# Patient Record
Sex: FEMALE | Race: WHITE | NOT HISPANIC OR LATINO | Employment: FULL TIME | URBAN - METROPOLITAN AREA
[De-identification: names, ages, dates, MRNs, and addresses within clinical notes are randomized per-mention and may not be internally consistent; named-entity substitution may affect disease eponyms.]

---

## 2022-03-15 ENCOUNTER — INITIAL PRENATAL (OUTPATIENT)
Dept: OBSTETRICS AND GYNECOLOGY | Facility: CLINIC | Age: 37
End: 2022-03-15

## 2022-03-15 VITALS
DIASTOLIC BLOOD PRESSURE: 68 MMHG | SYSTOLIC BLOOD PRESSURE: 100 MMHG | WEIGHT: 147.8 LBS | BODY MASS INDEX: 26.19 KG/M2 | HEIGHT: 63 IN

## 2022-03-15 DIAGNOSIS — Z3A.01 LESS THAN 8 WEEKS GESTATION OF PREGNANCY: Primary | ICD-10-CM

## 2022-03-15 PROCEDURE — 0501F PRENATAL FLOW SHEET: CPT | Performed by: OBSTETRICS & GYNECOLOGY

## 2022-03-15 RX ORDER — DOCOSAHEXAENOIC ACID 200 MG
CAPSULE ORAL
COMMUNITY
End: 2022-10-13

## 2022-03-15 RX ORDER — PRENATAL VIT NO.126/IRON/FOLIC 28MG-0.8MG
TABLET ORAL DAILY
COMMUNITY

## 2022-03-15 NOTE — PROGRESS NOTES
"Initial ob visit     CC- Here for care of pregnancy        Katy Jackson is a 36 y.o. female, , who presents for her first obstetrical visit.  Her last LMP was Patient's last menstrual period was 2022 (exact date). U/S today shows single viable IUP. New OB folder given and reviewed.    # 1 - Date: None, Sex: None, Weight: None, GA: None, Delivery: None, Apgar1: None, Apgar5: None, Living: None, Birth Comments: None      Current obstetric complaints : fatigue, breast tenderness  Initial positive test date : 20    [place] : [place]  Prior obstetric issues, None  Potential pregnancy concerns: none  Family history of genetic issues (includes FOB): no   Prior infections concerning in pregnancy (Rash, fever in last 2 weeks): no   Varicella Hx -as a child  Prior testing for Cystic Fibrosis Carrier or Sickle Cell Trait- no   Prepregnancy BMI - Body mass index is 26.18 kg/m².  Hx of HSV for patient or partner : no     Additional Pertinent History   Last Pap : [id number] per patient   Last Completed Pap Smear     This patient has no relevant Health Maintenance data.        History of abnormal Pap smear: yes - back in college, froze something per pt, no abnormals since  Family history of uterine, colon, breast, or ovarian cancer: yes - paternal grandmother  Performs monthly Self-Breast Exam: no  Exercises Regularly: yes  Feelings of Anxiety or Depression: no  Tobacco Usage?: No     The additional following portions of the patient's history were reviewed and updated as appropriate: allergies, current medications, past family history, past medical history, past social history, past surgical history and problem list.    Review of Systems   Review of Systems  Constitutional : Nausea, fatigue    : Vaginal bleeding, cramping   Breast Tenderness   All systems reviewed     /68   Ht 160 cm (63\")   Wt 67 kg (147 lb 12.8 oz)   LMP 2022 (Exact Date)   BMI 26.18 kg/m²     Physical Exam  General " Appearance:    Alert, cooperative, in no acute distress   Head:    Normocephalic, without obvious abnormality, atraumatic   Eyes:            Lids and lashes normal, conjunctivae and sclerae normal, no icterus, no pallor, corneas clear   Ears:    Ears appear intact with no abnormalities noted       Neck:      Neck without masses or thyromegaly    Abdomen:    Soft without masses or tenderness   :           Neck:   No adenopathy, supple, trachea midline, no thyromegaly   Back:     No kyphosis present, no scoliosis present,                       Extremities:   Moves all extremities well, no edema, no cyanosis   Skin:   No bleeding, bruising or rash   Lymph nodes:   No palpable adenopathy   Neurologic:   Sensation intact, A&O times 3        Assessment/Plan   Assessment     Problem List Items Addressed This Visit    None     Visit Diagnoses     Less than 8 weeks gestation of pregnancy    -  Primary    Relevant Orders    Obstetric Panel    Chlamydia trachomatis, Neisseria gonorrhoeae, PCR w/ confirmation - Urine, Urine, Clean Catch    HIV-1 / O / 2 Ag / Antibody 4th Generation    Urinalysis With Microscopic - Urine, Clean Catch    Urine Culture - Urine, Urine, Clean Catch    Urine Drug Screen - Urine, Clean Catch    Pap IG, HPV-hr          1. Pregnancy at 8w0d  2. AMA    Plan     1. Reviewed routine prenatal care with the office and educational materials given  2. Counseled on genetic testing options including CF DNA, carrier testing including CF, SMA, and Fragile X,  and NT screening.  3. Follow up: 4 week(s)        Claire Nguyen MD  03/15/2022

## 2022-03-17 LAB
ABO GROUP BLD: NORMAL
AMPHETAMINES UR QL SCN: NEGATIVE NG/ML
APPEARANCE UR: CLEAR
BACTERIA #/AREA URNS HPF: NORMAL /[HPF]
BACTERIA UR CULT: NORMAL
BACTERIA UR CULT: NORMAL
BARBITURATES UR QL SCN: NEGATIVE NG/ML
BASOPHILS # BLD AUTO: 0.1 X10E3/UL (ref 0–0.2)
BASOPHILS NFR BLD AUTO: 1 %
BENZODIAZ UR QL SCN: NEGATIVE NG/ML
BILIRUB UR QL STRIP: NEGATIVE
BLD GP AB SCN SERPL QL: NEGATIVE
BZE UR QL SCN: NEGATIVE NG/ML
C TRACH RRNA SPEC QL NAA+PROBE: NEGATIVE
CANNABINOIDS UR QL SCN: NEGATIVE NG/ML
CASTS URNS QL MICRO: NORMAL /LPF
COLOR UR: YELLOW
CREAT UR-MCNC: 14.5 MG/DL (ref 20–300)
EOSINOPHIL # BLD AUTO: 0.1 X10E3/UL (ref 0–0.4)
EOSINOPHIL NFR BLD AUTO: 1 %
EPI CELLS #/AREA URNS HPF: NORMAL /HPF (ref 0–10)
ERYTHROCYTE [DISTWIDTH] IN BLOOD BY AUTOMATED COUNT: 11.8 % (ref 11.7–15.4)
GLUCOSE UR QL STRIP: NEGATIVE
HBV SURFACE AG SERPL QL IA: NEGATIVE
HCT VFR BLD AUTO: 38.2 % (ref 34–46.6)
HCV AB S/CO SERPL IA: <0.1 S/CO RATIO (ref 0–0.9)
HGB BLD-MCNC: 13 G/DL (ref 11.1–15.9)
HGB UR QL STRIP: NEGATIVE
HIV 1+2 AB+HIV1 P24 AG SERPL QL IA: NON REACTIVE
IMM GRANULOCYTES # BLD AUTO: 0 X10E3/UL (ref 0–0.1)
IMM GRANULOCYTES NFR BLD AUTO: 0 %
KETONES UR QL STRIP: NEGATIVE
LABORATORY COMMENT REPORT: ABNORMAL
LEUKOCYTE ESTERASE UR QL STRIP: ABNORMAL
LYMPHOCYTES # BLD AUTO: 2.6 X10E3/UL (ref 0.7–3.1)
LYMPHOCYTES NFR BLD AUTO: 29 %
MCH RBC QN AUTO: 32.3 PG (ref 26.6–33)
MCHC RBC AUTO-ENTMCNC: 34 G/DL (ref 31.5–35.7)
MCV RBC AUTO: 95 FL (ref 79–97)
METHADONE UR QL SCN: NEGATIVE NG/ML
MICRO URNS: ABNORMAL
MONOCYTES # BLD AUTO: 0.6 X10E3/UL (ref 0.1–0.9)
MONOCYTES NFR BLD AUTO: 7 %
N GONORRHOEA RRNA SPEC QL NAA+PROBE: NEGATIVE
NEUTROPHILS # BLD AUTO: 5.4 X10E3/UL (ref 1.4–7)
NEUTROPHILS NFR BLD AUTO: 62 %
NITRITE UR QL STRIP: NEGATIVE
OPIATES UR QL SCN: NEGATIVE NG/ML
OXYCODONE+OXYMORPHONE UR QL SCN: NEGATIVE NG/ML
PCP UR QL: NEGATIVE NG/ML
PH UR STRIP: 7.5 [PH] (ref 5–7.5)
PH UR: 7.4 [PH] (ref 4.5–8.9)
PLATELET # BLD AUTO: 317 X10E3/UL (ref 150–450)
PROPOXYPH UR QL SCN: NEGATIVE NG/ML
PROT UR QL STRIP: NEGATIVE
RBC # BLD AUTO: 4.03 X10E6/UL (ref 3.77–5.28)
RBC #/AREA URNS HPF: NORMAL /HPF (ref 0–2)
RH BLD: POSITIVE
RPR SER QL: NON REACTIVE
RUBV IGG SERPL IA-ACNC: 3.76 INDEX
SP GR UR STRIP: 1 (ref 1–1.03)
SP GR UR: 1
UROBILINOGEN UR STRIP-MCNC: 0.2 MG/DL (ref 0.2–1)
WBC # BLD AUTO: 8.8 X10E3/UL (ref 3.4–10.8)
WBC #/AREA URNS HPF: NORMAL /HPF (ref 0–5)

## 2022-03-28 ENCOUNTER — TELEPHONE (OUTPATIENT)
Dept: OBSTETRICS AND GYNECOLOGY | Facility: CLINIC | Age: 37
End: 2022-03-28

## 2022-03-28 DIAGNOSIS — Z3A.10 10 WEEKS GESTATION OF PREGNANCY: Primary | ICD-10-CM

## 2022-03-29 ENCOUNTER — LAB (OUTPATIENT)
Dept: OBSTETRICS AND GYNECOLOGY | Facility: CLINIC | Age: 37
End: 2022-03-29

## 2022-04-08 ENCOUNTER — ROUTINE PRENATAL (OUTPATIENT)
Dept: OBSTETRICS AND GYNECOLOGY | Facility: CLINIC | Age: 37
End: 2022-04-08

## 2022-04-08 ENCOUNTER — TELEPHONE (OUTPATIENT)
Dept: OBSTETRICS AND GYNECOLOGY | Facility: CLINIC | Age: 37
End: 2022-04-08

## 2022-04-08 VITALS — SYSTOLIC BLOOD PRESSURE: 130 MMHG | BODY MASS INDEX: 26.15 KG/M2 | DIASTOLIC BLOOD PRESSURE: 72 MMHG | WEIGHT: 147.6 LBS

## 2022-04-08 DIAGNOSIS — O26.851 SPOTTING AFFECTING PREGNANCY IN FIRST TRIMESTER: ICD-10-CM

## 2022-04-08 DIAGNOSIS — Z3A.11 11 WEEKS GESTATION OF PREGNANCY: Primary | ICD-10-CM

## 2022-04-08 DIAGNOSIS — K59.00 CONSTIPATION, UNSPECIFIED CONSTIPATION TYPE: ICD-10-CM

## 2022-04-08 LAB
CLARITY, POC: CLEAR
COLOR UR: YELLOW
GLUCOSE UR STRIP-MCNC: NEGATIVE MG/DL
PROT UR STRIP-MCNC: NEGATIVE MG/DL

## 2022-04-08 PROCEDURE — 0502F SUBSEQUENT PRENATAL CARE: CPT | Performed by: NURSE PRACTITIONER

## 2022-04-08 NOTE — PROGRESS NOTES
OB FOLLOW UP  CC- Here for care of pregnancy        Katy Jackson is a 36 y.o.  11w3d patient being seen today for her obstetrical follow up visit. Patient reports vaginal spotting and headaches. Patient stated that the spotting occurred this morning.  Also yellow/brown discharge.  She has been constipated for about 2 days. Patient denies any cramping but does have pressure.     Her prenatal care is complicated by (and status) :  none      Flu Status: Already given in current flu season  Ultrasound Today: No.    ROS -   Patient Reports : Vaginal Spotting and Headaches  Patient Denies: Loss of Fluid, Vision Changes, Nausea , Vomiting , Contractions and Epigastric pain  Fetal Movement : Absent  All other systems reviewed and are negative.       The additional following portions of the patient's history were reviewed and updated as appropriate: allergies and current medications.    I have reviewed and agree with the HPI, ROS, and historical information as entered above. Desirae Huggins, APRN    /72   Wt 67 kg (147 lb 9.6 oz)   LMP 2022 (Exact Date)   BMI 26.15 kg/m²       EXAM:     FHT: unable to find heart tones with doppler in room; US wnl     Urine glucose/protein: See prenatal flowsheet       Assessment and Plan    Problem List Items Addressed This Visit    None     Visit Diagnoses     11 weeks gestation of pregnancy    -  Primary    Relevant Orders    POC Urinalysis Dipstick (Completed)          1. Pregnancy at 11w3d.  MBT +  2. Fetal status reassuring. Pelvic rest.  Stool softeners, Miralax.  3. Activity and Exercise discussed.  FU as scheduled and prn.    Desirae Huggins, APRN  2022

## 2022-04-08 NOTE — TELEPHONE ENCOUNTER
Spoke with pt and she states that she woke up this morning and had a greenish discharge and she has been constipated and unable to have a bowel movement for a couple of days. She states that she strained to have a bowel movement and had 1 spot of red blood and then when she used the restroom again she had some bright red streaking when she wiped. She denies any bleeding since this. She will come in to see IBETH Huggins @ 2. Pt VU

## 2022-04-12 ENCOUNTER — ROUTINE PRENATAL (OUTPATIENT)
Dept: OBSTETRICS AND GYNECOLOGY | Facility: CLINIC | Age: 37
End: 2022-04-12

## 2022-04-12 VITALS — WEIGHT: 150 LBS | BODY MASS INDEX: 26.57 KG/M2 | SYSTOLIC BLOOD PRESSURE: 114 MMHG | DIASTOLIC BLOOD PRESSURE: 60 MMHG

## 2022-04-12 DIAGNOSIS — Z12.4 SCREENING FOR CERVICAL CANCER: ICD-10-CM

## 2022-04-12 DIAGNOSIS — Z34.90 PREGNANCY, UNSPECIFIED GESTATIONAL AGE: Primary | ICD-10-CM

## 2022-04-12 PROBLEM — Z98.890 HISTORY OF LOOP ELECTRICAL EXCISION PROCEDURE (LEEP): Status: ACTIVE | Noted: 2022-04-12

## 2022-04-12 LAB
EXPIRATION DATE: 0
GLUCOSE UR STRIP-MCNC: NEGATIVE MG/DL
Lab: 0
PROT UR STRIP-MCNC: NEGATIVE MG/DL

## 2022-04-12 PROCEDURE — 0502F SUBSEQUENT PRENATAL CARE: CPT | Performed by: NURSE PRACTITIONER

## 2022-04-12 PROCEDURE — 81002 URINALYSIS NONAUTO W/O SCOPE: CPT | Performed by: NURSE PRACTITIONER

## 2022-04-12 NOTE — PROGRESS NOTES
OB FOLLOW UP    Katy Jackson is a 36 y.o.  12w0d patient being seen today for her obstetrical follow up visit. Patient reports she received her COVID booster a couple of weeks ago and has been experiencing constipation with hemorrhoids. Has started taking a stool softener and Miralax since last seen by ANNELISE Hernandez (see notes), and bowel movements have improved. Reports a tiny bit of spotting with bowel movement during wiping (last instance yesterday; none today). Denies cramping. Occasional yellowish tint to vaginal discharge with itch; denies discharge today (reports it's normal). Denies odor.    Genetic screening resulted negative.    Her prenatal care is complicated by (and status): None    The additional following portions of the patient's history were reviewed and updated as appropriate: allergies, current medications, past family history, past medical history, past social history, past surgical history and problem list.      ROS -   Symptoms: see above   Vaginal bleeding: uncertain if vaginal or rectal  Cramping/Contractions: none     /60   Wt 68 kg (150 lb)   LMP 2022 (Exact Date)   BMI 26.57 kg/m²     FHT:  present   Pelvic Exam: Performed: No     Assessment    1. Pregnancy at 12w0d  2. Fetal status reassuring       Problem List Items Addressed This Visit    None     Visit Diagnoses     Pregnancy, unspecified gestational age    -  Primary    Relevant Orders    POC Glucose, Urine, Qualitative, Dipstick    POC Protein, Urine, Qualitative, Dipstick          Plan    1. Genetic testing done, low risk   2. Continue Mirilax and stool softeners for constipation  3. Pap performed today  4. Follow up: 4 weeks or prn problems    Mirta Rodriguez RN  2022

## 2022-04-19 DIAGNOSIS — Z12.4 SCREENING FOR CERVICAL CANCER: ICD-10-CM

## 2022-04-29 ENCOUNTER — TELEPHONE (OUTPATIENT)
Dept: OBSTETRICS AND GYNECOLOGY | Facility: CLINIC | Age: 37
End: 2022-04-29

## 2022-04-29 NOTE — TELEPHONE ENCOUNTER
Pt called, has yeast infection. She has never heard anything back about pap results. She had taken over the counter medication. Has some questions and concerns

## 2022-04-29 NOTE — TELEPHONE ENCOUNTER
Per LOS: since + on pap can do Terazol 7. Once I spoke with the pt she reports starting Monistat 7 3 days ago and it seems to be helping, she was just making sure Monistat was ok to use. Informed this was ok to use and she should continue since it is helping her sx. If after completion her sx are not fully resolved, she should call back to get a Rx for the Terazol. Pt.. VU

## 2022-05-12 ENCOUNTER — ROUTINE PRENATAL (OUTPATIENT)
Dept: OBSTETRICS AND GYNECOLOGY | Facility: CLINIC | Age: 37
End: 2022-05-12

## 2022-05-12 VITALS — DIASTOLIC BLOOD PRESSURE: 74 MMHG | SYSTOLIC BLOOD PRESSURE: 100 MMHG | BODY MASS INDEX: 27.07 KG/M2 | WEIGHT: 152.8 LBS

## 2022-05-12 DIAGNOSIS — Z98.890 H/O LEEP: ICD-10-CM

## 2022-05-12 DIAGNOSIS — Z36.89 ENCOUNTER FOR FETAL ANATOMIC SURVEY: ICD-10-CM

## 2022-05-12 DIAGNOSIS — Z3A.16 16 WEEKS GESTATION OF PREGNANCY: Primary | ICD-10-CM

## 2022-05-12 LAB
EXPIRATION DATE: 0
GLUCOSE UR STRIP-MCNC: NEGATIVE MG/DL
Lab: 0
PROT UR STRIP-MCNC: NEGATIVE MG/DL

## 2022-05-12 PROCEDURE — 0502F SUBSEQUENT PRENATAL CARE: CPT | Performed by: OBSTETRICS & GYNECOLOGY

## 2022-05-12 RX ORDER — ACYCLOVIR 50 MG/G
OINTMENT TOPICAL
COMMUNITY
End: 2022-05-12

## 2022-05-12 RX ORDER — DEXTROAMPHETAMINE SACCHARATE, AMPHETAMINE ASPARTATE, DEXTROAMPHETAMINE SULFATE AND AMPHETAMINE SULFATE 2.5; 2.5; 2.5; 2.5 MG/1; MG/1; MG/1; MG/1
TABLET ORAL EVERY 12 HOURS SCHEDULED
COMMUNITY
End: 2022-05-12

## 2022-05-12 RX ORDER — DOCUSATE SODIUM 100 MG/1
CAPSULE, LIQUID FILLED ORAL
COMMUNITY

## 2022-05-12 RX ORDER — FEXOFENADINE HCL AND PSEUDOEPHEDRINE HCI 60; 120 MG/1; MG/1
TABLET, EXTENDED RELEASE ORAL
COMMUNITY
End: 2022-08-30

## 2022-05-12 RX ORDER — POLYETHYLENE GLYCOL 3350 17 G/17G
17 POWDER, FOR SOLUTION ORAL DAILY
COMMUNITY
End: 2022-10-13

## 2022-05-12 RX ORDER — AZELASTINE 1 MG/ML
SPRAY, METERED NASAL EVERY 12 HOURS SCHEDULED
COMMUNITY
End: 2022-05-12

## 2022-05-12 NOTE — PROGRESS NOTES
OB FOLLOW UP    Katy Jackson is a 36 y.o.  16w2d patient being seen today for her obstetrical follow up visit. Patient reports mild cramping, nausea with coffee.     Her prenatal care is complicated by (and status) : Abnormal Pap and history of LEEP in      The additional following portions of the patient's history were reviewed and updated as appropriate: allergies, current medications, past family history, past medical history, past social history, past surgical history and problem list.      ROS -   mild cramping, and nausea only when drinking coffee   Vaginal bleeding none    /74   Wt 69.3 kg (152 lb 12.8 oz)   LMP 2022 (Exact Date)   BMI 27.07 kg/m²     FHT:  present   Pelvic Exam: Performed: No     Assessment    1. Pregnancy at 16w2d  2. Fetal status reassuring   3. Counseled on MSAFP alone in relation to OTD and placental issues.      Problem List Items Addressed This Visit    None     Visit Diagnoses     16 weeks gestation of pregnancy    -  Primary    Relevant Orders    POC Protein, Urine, Qualitative, Dipstick (Completed)    POC Glucose, Urine, Qualitative, Dipstick (Completed)    US Ob Transvaginal    H/O LEEP        Relevant Orders    US Ob Transvaginal          Plan    1. Anatomy scan next visit.  U/S today for cervical length.   2. Reviewed routine prenatal care with the office and educational materials given  3. Follow up: 4 weeks  4. Call for any problems, bleeding    Claire Nguyen MD  2022

## 2022-05-14 LAB
AFP INTERP SERPL-IMP: NORMAL
AFP INTERP SERPL-IMP: NORMAL
AFP MOM SERPL: 1.73
AFP SERPL-MCNC: 62.1 NG/ML
AGE AT DELIVERY: 35.1 YR
GA METHOD: NORMAL
GA: 16.3 WEEKS
IDDM PATIENT QL: NO
LABORATORY COMMENT REPORT: NORMAL
MULTIPLE PREGNANCY: NO
NEURAL TUBE DEFECT RISK FETUS: 1514 %
RESULT: NORMAL

## 2022-06-07 ENCOUNTER — ROUTINE PRENATAL (OUTPATIENT)
Dept: OBSTETRICS AND GYNECOLOGY | Facility: CLINIC | Age: 37
End: 2022-06-07

## 2022-06-07 VITALS — WEIGHT: 156.6 LBS | BODY MASS INDEX: 27.74 KG/M2 | DIASTOLIC BLOOD PRESSURE: 65 MMHG | SYSTOLIC BLOOD PRESSURE: 118 MMHG

## 2022-06-07 DIAGNOSIS — Z36.2 ENCOUNTER FOR FOLLOW-UP ULTRASOUND OF FETAL ANATOMY: ICD-10-CM

## 2022-06-07 DIAGNOSIS — Z3A.20 20 WEEKS GESTATION OF PREGNANCY: Primary | ICD-10-CM

## 2022-06-07 LAB
EXPIRATION DATE: 0
GLUCOSE UR STRIP-MCNC: NEGATIVE MG/DL
Lab: 0
PROT UR STRIP-MCNC: NEGATIVE MG/DL

## 2022-06-07 PROCEDURE — 0502F SUBSEQUENT PRENATAL CARE: CPT

## 2022-06-07 NOTE — PROGRESS NOTES
OB FOLLOW UP    Katy Jackson is a 36 y.o.  20w0d patient being seen today for her obstetrical follow up visit. Patient reports constipation, spotting when straining to have a bowel movement when wiping .     Her prenatal care is complicated by (and status) : None      The additional following portions of the patient's history were reviewed and updated as appropriate: allergies, current medications, past family history, past medical history, past social history, past surgical history and problem list.    ROS -   constipation    Vaginal bleeding: none     /65   Wt 71 kg (156 lb 9.6 oz)   LMP 2022 (Exact Date)   BMI 27.74 kg/m²     FHT:  See ultrasound   Pelvic Exam: Performed: No     Assessment    1. Pregnancy at 20w0d  2. Fetal status reassuring       Problem List Items Addressed This Visit    None     Visit Diagnoses     20 weeks gestation of pregnancy    -  Primary    Relevant Orders    POC Protein, Urine, Qualitative, Dipstick (Completed)    POC Glucose, Urine, Qualitative, Dipstick (Completed)          Plan    1. Todays u/s reviewed and showed AC 25%, EFW 281g, Placental site: Anterior  Patient is on Prenatal vitamins  Activity recommendation : 150 minutes/week of moderate intensity aerobic activity unless we limit for bleeding, hypertension or other pregnancy complication   U/S ordered at follow up for heart views, profile, legs, feet. Recheck PCI site.  Follow Up: Return in about 4 weeks (around 2022) for ALEXANDREA DODSON.   Call for questions or concerns.    Yoselyn Lyons, ANNELISE  2022

## 2022-07-05 ENCOUNTER — ROUTINE PRENATAL (OUTPATIENT)
Dept: OBSTETRICS AND GYNECOLOGY | Facility: CLINIC | Age: 37
End: 2022-07-05

## 2022-07-05 VITALS — SYSTOLIC BLOOD PRESSURE: 118 MMHG | WEIGHT: 161 LBS | DIASTOLIC BLOOD PRESSURE: 70 MMHG | BODY MASS INDEX: 28.52 KG/M2

## 2022-07-05 DIAGNOSIS — O35.HXX0 CLUB FOOT OF FETUS AFFECTING ANTEPARTUM CARE OF MOTHER, SINGLE OR UNSPECIFIED FETUS: ICD-10-CM

## 2022-07-05 DIAGNOSIS — Z3A.24 24 WEEKS GESTATION OF PREGNANCY: Primary | ICD-10-CM

## 2022-07-05 LAB
GLUCOSE UR STRIP-MCNC: NEGATIVE MG/DL
PROT UR STRIP-MCNC: NEGATIVE MG/DL

## 2022-07-05 PROCEDURE — 0502F SUBSEQUENT PRENATAL CARE: CPT | Performed by: OBSTETRICS & GYNECOLOGY

## 2022-07-05 NOTE — PROGRESS NOTES
OB FOLLOW UP  CC- Here for care of pregnancy        Katy Jackson is a 36 y.o.  24w0d patient being seen today for her obstetrical follow up visit. Patient reports vaginal spotting X 2 since last visit but this has been an ongoing issue with her.     Her prenatal care is complicated by (and status) : AMA  Patient Active Problem List   Diagnosis   • History of loop electrical excision procedure (LEEP)         Ultrasound Today: Yes    ROS -   Patient Reports : Vaginal Spotting  Patient Denies: Loss of Fluid, Nausea , Vomiting  and Contractions  Fetal Movement : normal  All other systems reviewed and are negative.       The additional following portions of the patient's history were reviewed and updated as appropriate: allergies, current medications, past family history, past medical history, past social history, past surgical history and problem list.    I have reviewed and agree with the HPI, ROS, and historical information as entered above. Claire Nguyen MD    /70   Wt 73 kg (161 lb)   LMP 2022 (Exact Date)   BMI 28.52 kg/m²       EXAM:     Prenatal Vitals  BP: 118/70  Weight: 73 kg (161 lb)   Fetal Heart Rate: 137bpm               Urine Glucose Read-only: Negative  Urine Protein Read-only: Negative       Assessment and Plan    Problem List Items Addressed This Visit    None     Visit Diagnoses     24 weeks gestation of pregnancy    -  Primary    Relevant Orders    POC Urinalysis Dipstick (Completed)    [facility]  [facility] [place]    Club foot of fetus affecting antepartum care of mother, single or unspecified fetus        Relevant Orders    [facility] [place]          1. Pregnancy at 24w0d  2. Fetal status reassuring.  3. Anatomy scan with concern for club feet.  NIPTS testing was normal.  Will refer to PDC to check feet, spine and growth measurement.    4. 1 hour gtt, CBC, Antibody screen and TDAP next visit. Instructions given  5. Fetal movement/PTL or Labor  precautions  6. Discussed/encouraged TDAP vaccination after 28 weeks  7. Activity and Exercise discussed.  8. 4 weeks    Claire Nguyen MD  07/05/2022

## 2022-07-18 ENCOUNTER — HOSPITAL ENCOUNTER (OUTPATIENT)
Dept: WOMENS IMAGING | Facility: HOSPITAL | Age: 37
Discharge: HOME | End: 2022-07-18
Admitting: OBSTETRICS & GYNECOLOGY

## 2022-07-18 ENCOUNTER — OFFICE VISIT (OUTPATIENT)
Dept: OBSTETRICS AND GYNECOLOGY | Facility: HOSPITAL | Age: 37
End: 2022-07-18

## 2022-07-18 VITALS — DIASTOLIC BLOOD PRESSURE: 65 MMHG | SYSTOLIC BLOOD PRESSURE: 128 MMHG | BODY MASS INDEX: 29.23 KG/M2 | WEIGHT: 165 LBS

## 2022-07-18 DIAGNOSIS — Z87.42 HISTORY OF ABNORMAL CERVICAL PAPANICOLAOU SMEAR: ICD-10-CM

## 2022-07-18 DIAGNOSIS — Z34.90 PREGNANCY, UNSPECIFIED GESTATIONAL AGE: Primary | ICD-10-CM

## 2022-07-18 DIAGNOSIS — Q66.89 CLUBFOOT, CONGENITAL: ICD-10-CM

## 2022-07-18 DIAGNOSIS — O35.HXX0 CLUB FOOT OF FETUS AFFECTING ANTEPARTUM CARE OF MOTHER, SINGLE OR UNSPECIFIED FETUS: ICD-10-CM

## 2022-07-18 DIAGNOSIS — Z3A.24 24 WEEKS GESTATION OF PREGNANCY: ICD-10-CM

## 2022-07-18 DIAGNOSIS — O09.512 PRIMIGRAVIDA OF ADVANCED MATERNAL AGE IN SECOND TRIMESTER: ICD-10-CM

## 2022-07-18 PROCEDURE — 76811 OB US DETAILED SNGL FETUS: CPT | Performed by: OBSTETRICS & GYNECOLOGY

## 2022-07-18 PROCEDURE — 76811 OB US DETAILED SNGL FETUS: CPT

## 2022-07-18 NOTE — PROGRESS NOTES
Documentation of the ultasound findings, images, and interpretations will be available in the patient's Viewpoint report located in the Chart Review Imaging tab in [facility].

## 2022-07-22 ENCOUNTER — TELEPHONE (OUTPATIENT)
Dept: OBSTETRICS AND GYNECOLOGY | Facility: HOSPITAL | Age: 37
End: 2022-07-22

## 2022-07-22 NOTE — TELEPHONE ENCOUNTER
Pt was advised by Dr. Carlson that she should stop high intensity workouts.  PT has a membership to [email or web address], and she went to cancel it since she isn't able to do the workouts.  They want a doctors note stating that she isn't allowed to do them so they don't charge her a cancellation fee.  Can we send them a note.  Please contact the PT about this.

## 2022-07-22 NOTE — TELEPHONE ENCOUNTER
Suggested to pt to check to see if her gym would allow a medical suspension of fees until end of pregnancy.  Pt states that she already has and to achieve the suspension of fees she must have a note from the doctor.  Informed pt that Dr. Carlson is out of office today and not available to noelle her request.  Pt asked to leave this for him when he returns and  that she just needs it before 7/30/22

## 2022-08-09 ENCOUNTER — ROUTINE PRENATAL (OUTPATIENT)
Dept: OBSTETRICS AND GYNECOLOGY | Facility: CLINIC | Age: 37
End: 2022-08-09

## 2022-08-09 VITALS — SYSTOLIC BLOOD PRESSURE: 112 MMHG | WEIGHT: 166.6 LBS | DIASTOLIC BLOOD PRESSURE: 76 MMHG | BODY MASS INDEX: 29.51 KG/M2

## 2022-08-09 DIAGNOSIS — Z3A.28 28 WEEKS GESTATION OF PREGNANCY: Primary | ICD-10-CM

## 2022-08-09 DIAGNOSIS — O09.522 MULTIGRAVIDA OF ADVANCED MATERNAL AGE IN SECOND TRIMESTER: ICD-10-CM

## 2022-08-09 LAB
GLUCOSE UR STRIP-MCNC: NEGATIVE MG/DL
PROT UR STRIP-MCNC: NEGATIVE MG/DL

## 2022-08-09 PROCEDURE — 0502F SUBSEQUENT PRENATAL CARE: CPT | Performed by: OBSTETRICS & GYNECOLOGY

## 2022-08-09 NOTE — PROGRESS NOTES
OB FOLLOW UP  CC- Here for care of pregnancy        Katy Jackson is a 36 y.o.  29w0d patient being seen today for her obstetrical follow up. Patient reports heartburn. She is taking OTC medication for treatment occasionally.    Patient undergoing Glucola testing today. She is due for her testing at 12:46.     MBT: O+  Rhogam Given: N/A  TDAP: Will receive at next OV  Ultrasound Today: No. Patient had an US with PDC on 2022. Recommended f/u US with PDC in 6 weeks.    Her prenatal care is complicated by (and status) :   Patient Active Problem List   Diagnosis   • History of loop electrical excision procedure (LEEP)   • Pregnancy   • History of abnormal cervical Papanicolaou smear   • Clubfoot, congenital         ROS -   Patient Reports : see above  Patient Denies: Loss of Fluid, Vaginal Spotting, Vision Changes, Headaches, Nausea , Vomiting , Contractions and Epigastric pain  Fetal Movement : normal    The additional following portions of the patient's history were reviewed and updated as appropriate: allergies and current medications.    I have reviewed and agree with the HPI, ROS, and historical information as entered above. Claire Nguyen MD    /76   Wt 75.6 kg (166 lb 9.6 oz)   LMP 2022 (Exact Date)   BMI 29.51 kg/m²         EXAM:     Prenatal Vitals  BP: 112/76  Weight: 75.6 kg (166 lb 9.6 oz)             FHT positive      Urine Glucose Read-only: Negative  Urine Protein Read-only: Negative       Assessment and Plan    Problem List Items Addressed This Visit        Gravid and     Pregnancy - Primary    Relevant Orders    Antibody Screen    CBC (No Diff)    Gestational Screen 1 Hr ([facility])    POC Urinalysis Dipstick (Completed)      Other Visit Diagnoses     Multigravida of advanced maternal age in second trimester              1. Pregnancy at 29w0d  2. 1 hr Glucola, CBC, and antibody screen today  and TDAP given today  3. Fetal status reassuring.   4. Peds list  Transitional planning-talked with patient, plan is to go home with her boyfriend. Not wanting any home care at this time. Has ride. reviewed  5. Breast pump info given  6. PDC scan at f/u  7. Fetal movement/PTL or Labor precautions  8. Activity and Exercise discussed.    Claire Nguyen MD  08/09/2022

## 2022-08-10 LAB
BLD GP AB SCN SERPL QL: NEGATIVE
ERYTHROCYTE [DISTWIDTH] IN BLOOD BY AUTOMATED COUNT: 11.9 % (ref 12.3–15.4)
GLUCOSE 1H P 50 G GLC PO SERPL-MCNC: 141 MG/DL (ref 65–139)
HCT VFR BLD AUTO: 36.1 % (ref 34–46.6)
HGB BLD-MCNC: 12.7 G/DL (ref 12–15.9)
MCH RBC QN AUTO: 33.2 PG (ref 26.6–33)
MCHC RBC AUTO-ENTMCNC: 35.2 G/DL (ref 31.5–35.7)
MCV RBC AUTO: 94.3 FL (ref 79–97)
PLATELET # BLD AUTO: 248 10*3/MM3 (ref 140–450)
RBC # BLD AUTO: 3.83 10*6/MM3 (ref 3.77–5.28)
WBC # BLD AUTO: 10.59 10*3/MM3 (ref 3.4–10.8)

## 2022-08-11 ENCOUNTER — TELEPHONE (OUTPATIENT)
Dept: OBSTETRICS AND GYNECOLOGY | Facility: CLINIC | Age: 37
End: 2022-08-11

## 2022-08-12 ENCOUNTER — LAB (OUTPATIENT)
Dept: OBSTETRICS AND GYNECOLOGY | Facility: CLINIC | Age: 37
End: 2022-08-12

## 2022-08-12 DIAGNOSIS — R73.09 ELEVATED GLUCOSE TOLERANCE TEST: Primary | ICD-10-CM

## 2022-08-12 NOTE — TELEPHONE ENCOUNTER
S/w patient- informed patient of 28wk lab results. Patient will be in the office this AM for 3 hour GTT.

## 2022-08-13 LAB
GLUCOSE 1H P 100 G GLC PO SERPL-MCNC: 175 MG/DL (ref 65–179)
GLUCOSE 2H P 100 G GLC PO SERPL-MCNC: 141 MG/DL (ref 65–154)
GLUCOSE 3H P 100 G GLC PO SERPL-MCNC: 61 MG/DL (ref 65–139)
GLUCOSE P FAST SERPL-MCNC: 75 MG/DL (ref 65–94)

## 2022-08-19 ENCOUNTER — ROUTINE PRENATAL (OUTPATIENT)
Dept: OBSTETRICS AND GYNECOLOGY | Facility: CLINIC | Age: 37
End: 2022-08-19

## 2022-08-19 ENCOUNTER — HOSPITAL ENCOUNTER (OUTPATIENT)
Facility: HOSPITAL | Age: 37
Discharge: HOME | End: 2022-08-19
Attending: OBSTETRICS & GYNECOLOGY | Admitting: OBSTETRICS & GYNECOLOGY

## 2022-08-19 ENCOUNTER — TELEPHONE (OUTPATIENT)
Dept: OBSTETRICS AND GYNECOLOGY | Facility: CLINIC | Age: 37
End: 2022-08-19

## 2022-08-19 VITALS — BODY MASS INDEX: 29.55 KG/M2 | DIASTOLIC BLOOD PRESSURE: 80 MMHG | SYSTOLIC BLOOD PRESSURE: 126 MMHG | WEIGHT: 166.8 LBS

## 2022-08-19 DIAGNOSIS — Z3A.30 30 WEEKS GESTATION OF PREGNANCY: Primary | ICD-10-CM
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
## 2022-08-19 DIAGNOSIS — O45.003: ICD-10-CM

## 2022-08-19 LAB
GLUCOSE UR STRIP-MCNC: NEGATIVE MG/DL
PROT UR STRIP-MCNC: NEGATIVE MG/DL

## 2022-08-19 PROCEDURE — 59025 FETAL NON-STRESS TEST: CPT

## 2022-08-19 PROCEDURE — 0502F SUBSEQUENT PRENATAL CARE: CPT

## 2022-08-19 PROCEDURE — 0502F SUBSEQUENT PRENATAL CARE: CPT | Performed by: OBSTETRICS & GYNECOLOGY

## 2022-08-19 NOTE — PROGRESS NOTES
OB FOLLOW UP  CC- Here for care of pregnancy        Katy Jackson is a 36 y.o.  30w3d patient being seen today for her obstetrical visit. Patient reports falling yesterday. She states she tripped on curb approx 5:30pm on 2022.   Hit face, knees, head. Does not think she hit belly. Fell pretty hard. Baby moving like nml.     Her prenatal care is complicated by (and status) :    Patient Active Problem List   Diagnosis   • History of loop electrical excision procedure (LEEP)   • Pregnancy   • History of abnormal cervical Papanicolaou smear   • Clubfoot, congenital   • Fall       TDAP status: Documented as given @ 2022 appt. Will confirm and place under immunizations if so at next OB appt 2022.  Ultrasound Today: No  NST:  Reason for test:    Fall during pregnancy  Date of Test: 2022  Time frame of test: > 20mins  RN NST Interpretation:   Reactive    ROS -   Patient Reports : Fall   Patient Denies: Loss of Fluid, Vaginal Spotting, Vision Changes, Headaches, Nausea , Vomiting , Contractions and Epigastric pain  Fetal Movement : normal  All other systems reviewed and are negative.       The additional following portions of the patient's history were reviewed and updated as appropriate: allergies, current medications, past family history, past medical history, past social history, past surgical history and problem list.    I have reviewed and agree with the HPI, ROS, and historical information as entered above. Yoselyn Lyons, APRN    /80   Wt 75.7 kg (166 lb 12.8 oz)   LMP 2022 (Exact Date)   BMI 29.55 kg/m²       EXAM:     Prenatal Vitals  BP: 126/80  Weight: 75.7 kg (166 lb 12.8 oz)   Fetal Heart Rate: NST 140bpm       Urine Glucose Read-only: Negative  Urine Protein Read-only: Negative       Assessment and Plan  1. Tdap status no documented under immunizations. Make sure patient received.  Physical Examination: General appearance - alert, well appearing, and in no  distress  Heart - normal rate  Abdomen - soft, nontender, nondistended, no masses or organomegaly; no signs of injury  Neurological - alert, oriented, normal speech, no focal findings or movement disorder noted  Skin - normal coloration. Small scratch on knee    Problem List Items Addressed This Visit        Gravid and     Pregnancy - Primary    Relevant Orders    POC Urinalysis Dipstick (Completed)    CBC (No Diff)    Fibrinogen    Kleihauer-Betke Stain    KB Stain(Kleihauer-Bet.Stain) [facility]       Musculoskeletal and Injuries    Fall    Overview     Tripped on curb approx 5:30pm 2022.   Hit face, knees, head. Does not think she hit belly. Fell pretty hard.   Baby moving like nml.   NST 2022 Reactive.   Labs 2022: CBC, KBstain, Fibrinogen Pending          Relevant Orders    CBC (No Diff)    Fibrinogen    Kleihauer-Betke Stain    KB Stain(Kleihauer-Bet.Stain) [facility]          1. Pregnancy at 30w3d  2. Fetal status reassuring.  3. 28 week labs reviewed.    4. Activity and Exercise discussed.  5. Fetal movement/PTL or Labor precautions  6. Lab(s) Ordered- STAT  7. U/S ordered at follow up  8. Patient is on Prenatal vitamins  9. Call w/ any questions or concerns  Return for ALEXANDREA w/ LOS 22 (Tdap?) and PDC 22 as scheduled .        Yoselyn Lyons, ANNELISE Lyons, ANNELISE  2022

## 2022-08-19 NOTE — TELEPHONE ENCOUNTER
Patient is 30w3d and had fell yesterday.    She states she had hit her head and knees. But does not believe she hit her stomach.    She is having normal fetal movement. And no bruising.    Would like to speak to nurse.

## 2022-08-19 NOTE — TELEPHONE ENCOUNTER
Pt states she fell as she was stepping up on a curb/sidewalk yesterday. She tripped and fell forward, but tried to catch herself. States she has a bump on her head and a scratch on her knee. States she feels baby moving today. Denies any contractions, vaginal bleeding, abdominal pain. Informed pt she should come in for an NST today. Scheduled at 10:30

## 2022-08-19 NOTE — PROGRESS NOTES
OB FOLLOW UP  CC- Here for care of pregnancy        Katy Jackson is a 36 y.o.  30w3d patient being seen today for a fall this morning. Was seen in office early this morning. She had a +KB stain and presents for US.      Her prenatal care is complicated by (and status) :    Patient Active Problem List   Diagnosis   • History of loop electrical excision procedure (LEEP)   • Pregnancy   • History of abnormal cervical Papanicolaou smear   • Clubfoot, congenital   • Fall   • Placental abruption with coagulation defect in third trimester       Ultrasound Today: Yes.  Findings showed small abruption measuring 2 cm.  BPP 8 out of 8.  I have personally evaluated the U/S and agree with the findings. Teresa Holley MD    ROS -   Patient Reports : No Problems  Patient Denies: Vaginal Spotting, Contractions and pain or cramping  Fetal Movement : normal  All other systems reviewed and are negative.       The additional following portions of the patient's history were reviewed and updated as appropriate: allergies, current medications, past family history, past medical history, past social history, past surgical history and problem list.    I have reviewed and agree with the HPI, ROS, and historical information as entered above. Teresa Holley MD    LMP 2022 (Exact Date)       EXAM:     FHT: + on US     Urine glucose/protein: See prenatal flowsheet       Assessment and Plan    Problem List Items Addressed This Visit        Gravid and     Placental abruption with coagulation defect in third trimester    Overview     Small 2 cm abruption site noted on placenta after fall on 2022.  We will have patient come back to office with any bleeding or contractions.  We will follow-up in 4 days for reevaluation.  Labs stable.         Relevant Orders    US Ob Limited 1 + Fetuses    US Fetal Biophysical Profile;Without Non-Stress Testing       Musculoskeletal and Injuries    Fall -  Primary    Overview     Tripped on curb approx 5:30pm 8/18/2022.   Hit face, knees, head. Does not think she hit belly. Fell pretty hard.   Baby moving like nml.   NST 8/19/2022 Reactive.   Labs 8/19/2022: CBC, KBstain, Fibrinogen Pending          Relevant Orders    US Ob Limited 1 + Fetuses    US Fetal Biophysical Profile;Without Non-Stress Testing          1. Pregnancy at 30w3d  2. Fetal status reassuring.   3. RTC for worsening symptoms  Return in about 4 days (around 8/23/2022) for ultrasound with Claire Nguyen.    Teresa Holley MD  08/19/2022

## 2022-08-23 ENCOUNTER — ROUTINE PRENATAL (OUTPATIENT)
Dept: OBSTETRICS AND GYNECOLOGY | Facility: CLINIC | Age: 37
End: 2022-08-23

## 2022-08-23 VITALS — BODY MASS INDEX: 29.58 KG/M2 | WEIGHT: 167 LBS | DIASTOLIC BLOOD PRESSURE: 74 MMHG | SYSTOLIC BLOOD PRESSURE: 118 MMHG

## 2022-08-23 DIAGNOSIS — Z3A.31 31 WEEKS GESTATION OF PREGNANCY: Primary | ICD-10-CM

## 2022-08-23 DIAGNOSIS — O45.003: ICD-10-CM

## 2022-08-23 LAB
GLUCOSE UR STRIP-MCNC: NEGATIVE MG/DL
PROT UR STRIP-MCNC: NEGATIVE MG/DL

## 2022-08-23 PROCEDURE — 90715 TDAP VACCINE 7 YRS/> IM: CPT | Performed by: OBSTETRICS & GYNECOLOGY

## 2022-08-23 PROCEDURE — 0502F SUBSEQUENT PRENATAL CARE: CPT | Performed by: OBSTETRICS & GYNECOLOGY

## 2022-08-23 PROCEDURE — 90471 IMMUNIZATION ADMIN: CPT | Performed by: OBSTETRICS & GYNECOLOGY

## 2022-08-23 NOTE — PROGRESS NOTES
OB FOLLOW UP  CC- Here for care of pregnancy        Katy Jackson is a 36 y.o.  31w0d patient being seen today for her obstetrical follow up visit. Patient reports headache on Saturday. Patient denies vision changes or epigastric pain. That is relieved by Tylenol or rest. .       Reason for test: Other (Comment) (S/P fall on 22 with concern for retroplacental abruption)  Date of Test: 2022  Time frame of test: 20 minutes  NST Interpretation:      Her prenatal care is complicated by (and status) :  AMA and LEEP, Placental abruption with fall  Patient Active Problem List   Diagnosis   • History of loop electrical excision procedure (LEEP)   • Pregnancy   • History of abnormal cervical Papanicolaou smear   • Clubfoot, congenital   • Fall   • Placental abruption with coagulation defect in third trimester       TDAP status: given today  Ultrasound Today: Yes, BPP     ROS -   Patient Reports : Headaches  Patient Denies: Loss of Fluid, Vaginal Spotting, Vision Changes, Nausea , Vomiting , Contractions and Epigastric pain  Fetal Movement : normal  All other systems reviewed and are negative.       The additional following portions of the patient's history were reviewed and updated as appropriate: allergies and current medications.    I have reviewed and agree with the HPI, ROS, and historical information as entered above. Claire Nguyen MD    /74   Wt 75.8 kg (167 lb)   LMP 2022 (Exact Date)   BMI 29.58 kg/m²       EXAM:     Prenatal Vitals  BP: 118/74  Weight: 75.8 kg (167 lb)   Fetal Heart Rate: 148         NST NOTE    Indiction :   Marginal abruption  FHR:          Reactive, Cat 1, No decels  Contractions:    Irregular  Time Monitored:   > 20 minutes        Urine Glucose Read-only: Negative  Urine Protein Read-only: Negative       Assessment and Plan    Problem List Items Addressed This Visit        Gravid and     Pregnancy - Primary    Relevant Orders    POC  Urinalysis Dipstick (Completed)    Tdap Vaccine Greater Than or Equal To 6yo IM (Completed)    Placental abruption with coagulation defect in third trimester    Overview     Small 2 cm abruption site noted on placenta after fall on 8/19/2022.  We will have patient come back to office with any bleeding or contractions.  We will follow-up in 4 days for reevaluation.  Labs stable.               1. Pregnancy at 31w0d  2. Fetal status reassuring.  3. 28 week labs reviewed.    4. Activity and Exercise discussed.  5. U/S reviewed.  Small 3x1 marginal abruption visualized.  Discussed continued pelvic rest and reduced exercise.  PDC scan on Monday.  See me TUesday with NST  6. Prec given.      Claire Nguyen MD  08/23/2022

## 2022-08-29 ENCOUNTER — HOSPITAL ENCOUNTER (OUTPATIENT)
Dept: WOMENS IMAGING | Facility: HOSPITAL | Age: 37
Discharge: HOME | End: 2022-08-29
Admitting: OBSTETRICS & GYNECOLOGY

## 2022-08-29 ENCOUNTER — OFFICE VISIT (OUTPATIENT)
Dept: OBSTETRICS AND GYNECOLOGY | Facility: HOSPITAL | Age: 37
End: 2022-08-29

## 2022-08-29 VITALS — BODY MASS INDEX: 29.97 KG/M2 | SYSTOLIC BLOOD PRESSURE: 121 MMHG | WEIGHT: 169.2 LBS | DIASTOLIC BLOOD PRESSURE: 74 MMHG

## 2022-08-29 DIAGNOSIS — Z34.90 PREGNANCY, UNSPECIFIED GESTATIONAL AGE: ICD-10-CM

## 2022-08-29 DIAGNOSIS — O09.512 PRIMIGRAVIDA OF ADVANCED MATERNAL AGE IN SECOND TRIMESTER: ICD-10-CM

## 2022-08-29 DIAGNOSIS — Z34.90 PREGNANCY, UNSPECIFIED GESTATIONAL AGE: Primary | ICD-10-CM

## 2022-08-29 DIAGNOSIS — Z87.42 HISTORY OF ABNORMAL CERVICAL PAPANICOLAOU SMEAR: ICD-10-CM

## 2022-08-29 DIAGNOSIS — Q66.89 CLUBFOOT, CONGENITAL: ICD-10-CM

## 2022-08-29 PROCEDURE — 76816 OB US FOLLOW-UP PER FETUS: CPT

## 2022-08-29 PROCEDURE — 76819 FETAL BIOPHYS PROFIL W/O NST: CPT

## 2022-08-29 PROCEDURE — 76819 FETAL BIOPHYS PROFIL W/O NST: CPT | Performed by: OBSTETRICS & GYNECOLOGY

## 2022-08-29 PROCEDURE — 76816 OB US FOLLOW-UP PER FETUS: CPT | Performed by: OBSTETRICS & GYNECOLOGY

## 2022-08-29 PROCEDURE — 76820 UMBILICAL ARTERY ECHO: CPT

## 2022-08-29 PROCEDURE — 76820 UMBILICAL ARTERY ECHO: CPT | Performed by: OBSTETRICS & GYNECOLOGY

## 2022-08-29 NOTE — PROGRESS NOTES
Documentation of the ultasound findings, images, and interpretations will be available in the patient's Viewpoint report located in the Chart Review Imaging tab in [employer].

## 2022-08-30 ENCOUNTER — ROUTINE PRENATAL (OUTPATIENT)
Dept: OBSTETRICS AND GYNECOLOGY | Facility: CLINIC | Age: 37
End: 2022-08-30

## 2022-08-30 VITALS — DIASTOLIC BLOOD PRESSURE: 60 MMHG | WEIGHT: 170.4 LBS | BODY MASS INDEX: 30.19 KG/M2 | SYSTOLIC BLOOD PRESSURE: 104 MMHG

## 2022-08-30 DIAGNOSIS — Z3A.32 32 WEEKS GESTATION OF PREGNANCY: Primary | ICD-10-CM

## 2022-08-30 LAB
GLUCOSE UR STRIP-MCNC: NEGATIVE MG/DL
PROT UR STRIP-MCNC: NEGATIVE MG/DL

## 2022-08-30 PROCEDURE — 0502F SUBSEQUENT PRENATAL CARE: CPT | Performed by: OBSTETRICS & GYNECOLOGY

## 2022-09-06 ENCOUNTER — ROUTINE PRENATAL (OUTPATIENT)
Dept: OBSTETRICS AND GYNECOLOGY | Facility: CLINIC | Age: 37
End: 2022-09-06

## 2022-09-06 VITALS — SYSTOLIC BLOOD PRESSURE: 114 MMHG | DIASTOLIC BLOOD PRESSURE: 74 MMHG | BODY MASS INDEX: 30.29 KG/M2 | WEIGHT: 171 LBS

## 2022-09-06 DIAGNOSIS — Z3A.33 33 WEEKS GESTATION OF PREGNANCY: Primary | ICD-10-CM

## 2022-09-06 LAB
GLUCOSE UR STRIP-MCNC: NEGATIVE MG/DL
PROT UR STRIP-MCNC: NEGATIVE MG/DL

## 2022-09-06 NOTE — PROGRESS NOTES
OB FOLLOW UP  CC- Here for care of pregnancy        Katy Jackson is a 37 y.o.  33w0d patient being seen today for her obstetrical follow up visit. Patient reports one episode of abdominal cramping with diarrhea & vomiting x1 yesterday. Patient denies any bleeding. Patient reports feeling better today.     Reason for test:  SGA & AMA  Date of Test: 2022  Time frame of test: 20 mins  NST Interpretation:      Her prenatal care is complicated by (and status) : AMA and SGA  Patient Active Problem List   Diagnosis   • History of loop electrical excision procedure (LEEP)   • Pregnancy   • History of abnormal cervical Papanicolaou smear   • Clubfoot, congenital   • Fall   • Placental abruption with coagulation defect in third trimester       Ultrasound Today: No    ROS -   Patient Reports : see above  Patient Denies: Loss of Fluid, Vaginal Spotting, Vision Changes, Headaches, Contractions and Epigastric pain  Fetal Movement : normal  All other systems reviewed and are negative.       The additional following portions of the patient's history were reviewed and updated as appropriate: allergies and current medications.    I have reviewed and agree with the HPI, ROS, and historical information as entered above. Claire Nguyen MD    /74   Wt 77.6 kg (171 lb)   LMP 2022 (Exact Date)   BMI 30.29 kg/m²       EXAM:     Prenatal Vitals  BP: 114/74  Weight: 77.6 kg (171 lb)             NST NOTE    Indiction :   SGA  FHR:          Reactive, Cat 1, No decels  Contractions:    Irregular  Time Monitored:   > 20 minutes        Urine Glucose Read-only: Negative  Urine Protein Read-only: Negative       Assessment and Plan    Problem List Items Addressed This Visit        Other    Pregnancy - Primary    Relevant Orders    POC Urinalysis Dipstick (Completed)          1. Pregnancy at 33w0d  2. Biweekly NST per PDC for SGA  3. Fetal status reassuring.   4. Activity and Exercise discussed.  5. PAOLA  prec.      Claire Nguyen MD  09/06/2022

## 2022-09-07 ENCOUNTER — TELEPHONE (OUTPATIENT)
Dept: OBSTETRICS AND GYNECOLOGY | Facility: CLINIC | Age: 37
End: 2022-09-07

## 2022-09-07 NOTE — TELEPHONE ENCOUNTER
Pt CALLING BECAUSE SHE HAS A SORE THROAT TODAY AND HAS WHITE PATCHES ON HER THROAT. SHE WANTS TO KNOW IF SHE NEEDS TO BE SEEN BY PCP OR WHAT SHE SHOULD DO.

## 2022-09-07 NOTE — TELEPHONE ENCOUNTER
Pt states she woke up with a sore throat and can see white spots on her tonsils. Also reports some congestion. States she took a COVID test today and it was negative. Also states she has taken Tylenol and does feel better than she did this morning. Informed pt of OTC medications safe to take in pregnancy (Mucinex, Robitussin, cough drops, Vicks vapor rub) and that she is okay to call her PCP or go to an [facility] to be tested for strep throat. Pt VU

## 2022-09-09 ENCOUNTER — ROUTINE PRENATAL (OUTPATIENT)
Dept: OBSTETRICS AND GYNECOLOGY | Facility: CLINIC | Age: 37
End: 2022-09-09

## 2022-09-09 VITALS — DIASTOLIC BLOOD PRESSURE: 72 MMHG | BODY MASS INDEX: 30.72 KG/M2 | WEIGHT: 173.4 LBS | SYSTOLIC BLOOD PRESSURE: 128 MMHG

## 2022-09-09 DIAGNOSIS — Z3A.33 33 WEEKS GESTATION OF PREGNANCY: Primary | ICD-10-CM

## 2022-09-09 DIAGNOSIS — O36.5939 SGA (SMALL FOR GESTATIONAL AGE), FETAL, AFFECTING CARE OF MOTHER, ANTEPARTUM, THIRD TRIMESTER, OTHER FETUS: ICD-10-CM

## 2022-09-09 DIAGNOSIS — O45.003: ICD-10-CM

## 2022-09-09 LAB
EXPIRATION DATE: 0
GLUCOSE UR STRIP-MCNC: NEGATIVE MG/DL
Lab: 0
PROT UR STRIP-MCNC: NEGATIVE MG/DL

## 2022-09-09 PROCEDURE — 59025 FETAL NON-STRESS TEST: CPT | Performed by: NURSE PRACTITIONER

## 2022-09-09 PROCEDURE — 0502F SUBSEQUENT PRENATAL CARE: CPT | Performed by: NURSE PRACTITIONER

## 2022-09-09 NOTE — PROGRESS NOTES
OB FOLLOW UP  CC- Here for care of pregnancy        Katy Jackson is a 37 y.o.  33w3d patient being seen today for her obstetrical follow up visit. Patient reports tightening in stomach after eating and headaches from current sinus infection.     Her prenatal care is complicated by (and status) :    Patient Active Problem List   Diagnosis   • History of loop electrical excision procedure (LEEP)   • Pregnancy   • History of abnormal cervical Papanicolaou smear   • Clubfoot, congenital   • Fall   • Placental abruption with coagulation defect in third trimester   • SGA (small for gestational age), fetal, affecting care of mother, antepartum, third trimester, other fetus       Ultrasound Today: No    ROS -   Patient Reports : Headaches and contractions  Patient Denies: Loss of Fluid, Vaginal Spotting, Vision Changes, Nausea , Vomiting  and Epigastric pain  Fetal Movement : normal  All other systems reviewed and are negative.       The additional following portions of the patient's history were reviewed and updated as appropriate: allergies and current medications.    I have reviewed and agree with the HPI, ROS, and historical information as entered above. Desirae Huggins, APRN    /72   Wt 78.7 kg (173 lb 6.4 oz)   LMP 2022 (Exact Date)   BMI 30.72 kg/m²      Fetal Non-Stress Test  Patient: Katy Jackson  : 1985  MRN: [id number]  CSN: [id number]  Date: 2022    Estimated Date of Delivery: 10/25/22  Gestational Age: 33w3d    Indication for NST SGA and abruption                   Interpretation            Decelerations none       Additional Comments reactive       Recommendations for f/u 1/2 wk       Desirae Huggins, APRN      EXAM:     Prenatal Vitals  BP: 128/72  Weight: 78.7 kg (173 lb 6.4 oz)                   Urine Glucose Read-only: Negative  Urine Protein Read-only: Negative       Assessment and Plan    Problem List Items Addressed This Visit         Gravid and     Pregnancy - Primary    Relevant Orders    POC Glucose, Urine, Qualitative, Dipstick (Completed)    POC Protein, Urine, Qualitative, Dipstick (Completed)    Placental abruption with coagulation defect in third trimester    Overview     Small 2 cm abruption site noted on placenta after fall on 2022.  We will have patient come back to office with any bleeding or contractions.  We will follow-up in 4 days for reevaluation.  Labs stable.            Other    SGA (small for gestational age), fetal, affecting care of mother, antepartum, third trimester, other fetus          1. Pregnancy at 33w3d  2. Fetal status reassuring.  3. 28 week labs reviewed.    4. Activity and Exercise discussed.  5. Fetal movement/PTL or Labor precautions  6. Patient is on Prenatal vitamins  Return in about 4 days (around 2022) for NST.    Desirae Huggins, APRN  2022

## 2022-09-13 ENCOUNTER — ROUTINE PRENATAL (OUTPATIENT)
Dept: OBSTETRICS AND GYNECOLOGY | Facility: CLINIC | Age: 37
End: 2022-09-13

## 2022-09-13 VITALS — BODY MASS INDEX: 30.75 KG/M2 | DIASTOLIC BLOOD PRESSURE: 76 MMHG | WEIGHT: 173.6 LBS | SYSTOLIC BLOOD PRESSURE: 118 MMHG

## 2022-09-13 DIAGNOSIS — Z3A.34 34 WEEKS GESTATION OF PREGNANCY: Primary | ICD-10-CM

## 2022-09-13 LAB
GLUCOSE UR STRIP-MCNC: NEGATIVE MG/DL
PROT UR STRIP-MCNC: NEGATIVE MG/DL

## 2022-09-13 PROCEDURE — 0502F SUBSEQUENT PRENATAL CARE: CPT | Performed by: OBSTETRICS & GYNECOLOGY

## 2022-09-13 PROCEDURE — 59025 FETAL NON-STRESS TEST: CPT | Performed by: OBSTETRICS & GYNECOLOGY

## 2022-09-13 RX ORDER — CEFDINIR 300 MG/1
CAPSULE ORAL
COMMUNITY
Start: 2022-09-12 | End: 2022-10-13 | Stop reason: HOSPADM

## 2022-09-13 RX ORDER — TRIAMCINOLONE ACETONIDE 55 UG/1
SPRAY, METERED NASAL
COMMUNITY
End: 2022-10-13

## 2022-09-13 RX ORDER — LORATADINE 10 MG/1
TABLET ORAL
COMMUNITY
End: 2022-10-13

## 2022-09-13 NOTE — PROGRESS NOTES
OB FOLLOW UP  CC- Here for care of pregnancy        Katy Jackson is a 37 y.o.  34w0d patient being seen today for her obstetrical follow up visit. Patient reports headache (that is relieved by Tylenol or rest). Patient recently diagnosed with a sinus infection. Patient has been on an antibiotic for 3 days and states that her headache is much improved since prior to starting antibiotics.     Her prenatal care is complicated by (and status) :   Patient Active Problem List   Diagnosis   • History of loop electrical excision procedure (LEEP)   • Pregnancy   • History of abnormal cervical Papanicolaou smear   • Clubfoot, congenital   • Fall   • Placental abruption with coagulation defect in third trimester   • SGA (small for gestational age), fetal, affecting care of mother, antepartum, third trimester, other fetus       Ultrasound Today: No  Reason for test:  AMA  Date of Test: 2022  Time frame of test: 20 minutes  RN NST Interpretation:          ROS -   Patient Reports : see above  Patient Denies: Loss of Fluid, Vaginal Spotting, Vision Changes, Nausea , Vomiting , Contractions and Epigastric pain  Fetal Movement : normal  All other systems reviewed and are negative.       The additional following portions of the patient's history were reviewed and updated as appropriate: allergies and current medications.    I have reviewed and agree with the HPI, ROS, and historical information as entered above. Claire Nguyen MD    /76   Wt 78.7 kg (173 lb 9.6 oz)   LMP 2022 (Exact Date)   BMI 30.75 kg/m²       EXAM:     Prenatal Vitals  BP: 118/76  Weight: 78.7 kg (173 lb 9.6 oz)   Fetal Heart Rate: present         NST NOTE    Indiction :   Growth lag, ? abruption  FHR:          Reactive, Cat 1, No decels  Contractions:    Irregular  Time Monitored:   > 20 minutes        Urine Glucose Read-only: Negative  Urine Protein Read-only: Negative       Assessment and Plan    Problem List Items  Addressed This Visit        Gravid and     Pregnancy - Primary    Relevant Orders    POC Urinalysis Dipstick (Completed)          1. Pregnancy at 34w0d  2. Fetal status reassuring.   3. Activity and Exercise discussed.  4. Fetal movement/PTL or Labor precautions   5. Continue biweekly NST per PDC  Return in about 2 weeks (around 2022).    Claire Nguyen MD  2022

## 2022-09-16 ENCOUNTER — ROUTINE PRENATAL (OUTPATIENT)
Dept: OBSTETRICS AND GYNECOLOGY | Facility: CLINIC | Age: 37
End: 2022-09-16

## 2022-09-16 VITALS — SYSTOLIC BLOOD PRESSURE: 116 MMHG | BODY MASS INDEX: 31.28 KG/M2 | WEIGHT: 176.6 LBS | DIASTOLIC BLOOD PRESSURE: 76 MMHG

## 2022-09-16 DIAGNOSIS — O36.5939 SGA (SMALL FOR GESTATIONAL AGE), FETAL, AFFECTING CARE OF MOTHER, ANTEPARTUM, THIRD TRIMESTER, OTHER FETUS: ICD-10-CM

## 2022-09-16 DIAGNOSIS — Z3A.34 34 WEEKS GESTATION OF PREGNANCY: Primary | ICD-10-CM

## 2022-09-16 PROCEDURE — 59025 FETAL NON-STRESS TEST: CPT | Performed by: NURSE PRACTITIONER

## 2022-09-16 PROCEDURE — 0502F SUBSEQUENT PRENATAL CARE: CPT | Performed by: NURSE PRACTITIONER

## 2022-09-16 NOTE — PROGRESS NOTES
OB FOLLOW UP  CC- Here for care of pregnancy        Katy Jackson is a 37 y.o.  34w3d patient being seen today for her obstetrical follow up visit. Patient reports cramping, contractions, and tightness. Patient stated that she had a sinus and eye infection which caused her to have headaches. Patient stated that she was taking antibiotics.    Her prenatal care is complicated by (and status) : AMA  Patient Active Problem List   Diagnosis   • History of loop electrical excision procedure (LEEP)   • Pregnancy   • History of abnormal cervical Papanicolaou smear   • Clubfoot, congenital   • Fall   • Placental abruption with coagulation defect in third trimester   • SGA (small for gestational age), fetal, affecting care of mother, antepartum, third trimester, other fetus       Flu Status: Already given in current flu season  Ultrasound Today: No  Non Stress Test: Yes minutes > 20 minutes  non-stress test: NST: Reactive  indication: Fetal Growth Restriction (IUGR)      ROS -   Patient Reports : Cramping, Epigastric Pain and Contractions  Patient Denies: Loss of Fluid, Vaginal Spotting, Vision Changes, Nausea  and Vomiting   Fetal Movement : normal  All other systems reviewed and are negative.       The additional following portions of the patient's history were reviewed and updated as appropriate: allergies and current medications.    I have reviewed and agree with the HPI, ROS, and historical information as entered above. Nicole Lake, APRN    /76   Wt 80.1 kg (176 lb 9.6 oz)   LMP 2022 (Exact Date)   BMI 31.28 kg/m²       EXAM:     Prenatal Vitals  BP: 116/76  Weight: 80.1 kg (176 lb 9.6 oz)   Fetal Heart Rate: NST               Urine Glucose Read-only: Negative  Urine Protein Read-only: Negative       Assessment and Plan    Problem List Items Addressed This Visit        Gravid and     Pregnancy - Primary    Relevant Orders    POC Urinalysis Dipstick (Completed)        Other    SGA (small for gestational age), fetal, affecting care of mother, antepartum, third trimester, other fetus    Current Assessment & Plan     PDC US 9/19/22                1. Pregnancy at 34w3d  2. Fetal status reassuring.   3. Activity and Exercise discussed.  4. Fetal movement and PTL signs reviewed.  5. FU 9/19/22 with PDC and US and continue bi-weekly NST (9/20/22) with NADJA Lake, APRN  09/16/2022

## 2022-09-19 ENCOUNTER — HOSPITAL ENCOUNTER (OUTPATIENT)
Dept: WOMENS IMAGING | Facility: HOSPITAL | Age: 37
Discharge: HOME | End: 2022-09-19
Admitting: OBSTETRICS & GYNECOLOGY

## 2022-09-19 ENCOUNTER — OFFICE VISIT (OUTPATIENT)
Dept: OBSTETRICS AND GYNECOLOGY | Facility: HOSPITAL | Age: 37
End: 2022-09-19

## 2022-09-19 VITALS — WEIGHT: 175 LBS | BODY MASS INDEX: 31 KG/M2 | SYSTOLIC BLOOD PRESSURE: 122 MMHG | DIASTOLIC BLOOD PRESSURE: 81 MMHG

## 2022-09-19 DIAGNOSIS — Q66.89 CLUBFOOT, CONGENITAL: ICD-10-CM

## 2022-09-19 DIAGNOSIS — O36.5939 SGA (SMALL FOR GESTATIONAL AGE), FETAL, AFFECTING CARE OF MOTHER, ANTEPARTUM, THIRD TRIMESTER, OTHER FETUS: ICD-10-CM

## 2022-09-19 DIAGNOSIS — O45.003: ICD-10-CM

## 2022-09-19 DIAGNOSIS — Z34.90 PREGNANCY, UNSPECIFIED GESTATIONAL AGE: ICD-10-CM

## 2022-09-19 DIAGNOSIS — Z3A.34 34 WEEKS GESTATION OF PREGNANCY: ICD-10-CM

## 2022-09-19 DIAGNOSIS — Q66.89 CLUBFOOT, CONGENITAL: Primary | ICD-10-CM

## 2022-09-19 PROCEDURE — 76819 FETAL BIOPHYS PROFIL W/O NST: CPT | Performed by: OBSTETRICS & GYNECOLOGY

## 2022-09-19 PROCEDURE — 76816 OB US FOLLOW-UP PER FETUS: CPT | Performed by: OBSTETRICS & GYNECOLOGY

## 2022-09-19 PROCEDURE — 76816 OB US FOLLOW-UP PER FETUS: CPT

## 2022-09-19 PROCEDURE — 99213 OFFICE O/P EST LOW 20 MIN: CPT | Performed by: OBSTETRICS & GYNECOLOGY

## 2022-09-19 PROCEDURE — 76819 FETAL BIOPHYS PROFIL W/O NST: CPT

## 2022-09-19 NOTE — PROGRESS NOTES
Patient seen in Maternal Fetal Medicine clinic today. Please see full note in under imaging tab of patient chart in Epic (Viewpoint report).    Rachael Reid MD

## 2022-09-22 ENCOUNTER — ROUTINE PRENATAL (OUTPATIENT)
Dept: OBSTETRICS AND GYNECOLOGY | Facility: CLINIC | Age: 37
End: 2022-09-22

## 2022-09-22 VITALS — BODY MASS INDEX: 31.04 KG/M2 | WEIGHT: 175.2 LBS | DIASTOLIC BLOOD PRESSURE: 78 MMHG | SYSTOLIC BLOOD PRESSURE: 122 MMHG

## 2022-09-22 DIAGNOSIS — Z3A.35 35 WEEKS GESTATION OF PREGNANCY: ICD-10-CM
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
## 2022-09-22 LAB
EXPIRATION DATE: 0
GLUCOSE UR STRIP-MCNC: NEGATIVE MG/DL
Lab: 0
PROT UR STRIP-MCNC: NEGATIVE MG/DL

## 2022-09-22 PROCEDURE — 59025 FETAL NON-STRESS TEST: CPT | Performed by: OBSTETRICS & GYNECOLOGY

## 2022-09-22 PROCEDURE — 0502F SUBSEQUENT PRENATAL CARE: CPT | Performed by: OBSTETRICS & GYNECOLOGY

## 2022-09-22 NOTE — PROGRESS NOTES
OB FOLLOW UP  CC- Here for care of pregnancy        Katy Jackson is a 37 y.o.  35w2d patient being seen today for her obstetrical follow up visit. Patient reports heartburn. She is taking OTC medication for treatment currently. She also reports occasional BH contractions.     Her prenatal care is complicated by (and status) :   Patient Active Problem List   Diagnosis   • History of loop electrical excision procedure (LEEP)   • Pregnancy   • History of abnormal cervical Papanicolaou smear   • Clubfoot, congenital   • Fall   • Placental abruption with coagulation defect in third trimester   • SGA (small for gestational age), fetal, affecting care of mother, antepartum, third trimester, other fetus       Ultrasound Today: No  Non Stress Test: Yes for AMA and SGA- 20  minutes      ROS -   Patient Reports : BH contractions, heartburn   Patient Denies: Loss of Fluid, Vaginal Spotting, Vision Changes, Headaches, Nausea , Vomiting  and Epigastric pain  Fetal Movement : normal  All other systems reviewed and are negative.       The additional following portions of the patient's history were reviewed and updated as appropriate: allergies and current medications.    I have reviewed and agree with the HPI, ROS, and historical information as entered above. Claire Nguyen MD    /78   Wt 79.5 kg (175 lb 3.2 oz)   LMP 2022 (Exact Date)   BMI 31.04 kg/m²       EXAM:     Prenatal Vitals  BP: 122/78  Weight: 79.5 kg (175 lb 3.2 oz)             FHT positive    NST NOTE    Indiction :    SGA  FHR:          Reactive, Cat 1, No decels  Contractions:    Irregular  Time Monitored:   > 20 minutes        Urine Glucose Read-only: Negative  Urine Protein Read-only: Negative       Assessment and Plan    Problem List Items Addressed This Visit        Gravid and     Pregnancy    Relevant Orders    POC Protein, Urine, Qualitative, Dipstick (Completed)    POC Glucose, Urine, Qualitative, Dipstick  (Completed)       Musculoskeletal and Injuries    Fall - Primary    Overview     Tripped on curb approx 5:30pm 8/18/2022.   Hit face, knees, head. Does not think she hit belly. Fell pretty hard.   Baby moving like nml.   NST 8/19/2022 Reactive.   Labs 8/19/2022: CBC, KBstain, Fibrinogen. KB stain +               1. Pregnancy at 35w2d  2. Breech  3. Fetal status reassuring.  Continue biweekly NST  4. Reviewed previous u/s.  She has f/u with Aldo on Monday.    5. Activity and Exercise discussed.  6. Fetal movement/PTL or Labor precautions  7. GBS next visit    Claire Nguyen MD  09/22/2022

## 2022-09-27 ENCOUNTER — HOSPITAL ENCOUNTER (OUTPATIENT)
Dept: WOMENS IMAGING | Facility: HOSPITAL | Age: 37
Discharge: HOME | End: 2022-09-27

## 2022-09-27 ENCOUNTER — OFFICE VISIT (OUTPATIENT)
Dept: OBSTETRICS AND GYNECOLOGY | Facility: HOSPITAL | Age: 37
End: 2022-09-27

## 2022-09-27 ENCOUNTER — LAB (OUTPATIENT)
Dept: LAB | Facility: HOSPITAL | Age: 37
End: 2022-09-27

## 2022-09-27 ENCOUNTER — ROUTINE PRENATAL (OUTPATIENT)
Dept: OBSTETRICS AND GYNECOLOGY | Facility: CLINIC | Age: 37
End: 2022-09-27

## 2022-09-27 VITALS — WEIGHT: 176.6 LBS | DIASTOLIC BLOOD PRESSURE: 78 MMHG | SYSTOLIC BLOOD PRESSURE: 112 MMHG | BODY MASS INDEX: 31.28 KG/M2

## 2022-09-27 VITALS — SYSTOLIC BLOOD PRESSURE: 115 MMHG | WEIGHT: 177 LBS | BODY MASS INDEX: 31.35 KG/M2 | DIASTOLIC BLOOD PRESSURE: 84 MMHG

## 2022-09-27 DIAGNOSIS — O36.5939 SGA (SMALL FOR GESTATIONAL AGE), FETAL, AFFECTING CARE OF MOTHER, ANTEPARTUM, THIRD TRIMESTER, OTHER FETUS: ICD-10-CM

## 2022-09-27 DIAGNOSIS — Z3A.36 36 WEEKS GESTATION OF PREGNANCY: Primary | ICD-10-CM

## 2022-09-27 DIAGNOSIS — Q66.89 CLUBFOOT, CONGENITAL: ICD-10-CM

## 2022-09-27 DIAGNOSIS — Q66.89 CLUBFOOT, CONGENITAL: Primary | ICD-10-CM

## 2022-09-27 DIAGNOSIS — Z3A.36 36 WEEKS GESTATION OF PREGNANCY: ICD-10-CM

## 2022-09-27 LAB
GLUCOSE UR STRIP-MCNC: NEGATIVE MG/DL
PROT UR STRIP-MCNC: NEGATIVE MG/DL

## 2022-09-27 PROCEDURE — 76819 FETAL BIOPHYS PROFIL W/O NST: CPT | Performed by: OBSTETRICS & GYNECOLOGY

## 2022-09-27 PROCEDURE — 0502F SUBSEQUENT PRENATAL CARE: CPT | Performed by: OBSTETRICS & GYNECOLOGY

## 2022-09-27 PROCEDURE — 87081 CULTURE SCREEN ONLY: CPT

## 2022-09-27 PROCEDURE — 76820 UMBILICAL ARTERY ECHO: CPT

## 2022-09-27 PROCEDURE — 76820 UMBILICAL ARTERY ECHO: CPT | Performed by: OBSTETRICS & GYNECOLOGY

## 2022-09-27 PROCEDURE — 59025 FETAL NON-STRESS TEST: CPT | Performed by: OBSTETRICS & GYNECOLOGY

## 2022-09-27 PROCEDURE — 76819 FETAL BIOPHYS PROFIL W/O NST: CPT

## 2022-09-27 NOTE — PROGRESS NOTES
OB FOLLOW UP  CC- Here for care of pregnancy        Katy Jackson is a 37 y.o.  36w0d patient being seen today for her obstetrical follow up visit. Patient reports heartburn (she is taking OTC medication for treatment currently) and vaginal spotting. Patient states that she had 1 instance of light pink vaginal spotting on Saturday after IC. Patient denies vaginal bleeding since.     Her prenatal care is complicated by (and status) :   Patient Active Problem List   Diagnosis   • History of loop electrical excision procedure (LEEP)   • Pregnancy   • History of abnormal cervical Papanicolaou smear   • Clubfoot, congenital   • Fall   • Placental abruption with coagulation defect in third trimester   • SGA (small for gestational age), fetal, affecting care of mother, antepartum, third trimester, other fetus       GBS Status: Done Today. She is not allergic to PCN.    No Known Allergies       Her Delivery Plan is: discuss today    US today: Yes with PDC    Reason for test:  AMA, SGA, Bilateral clubbed feet, and Placental shelf in RUQ  Date of Test: 2022  Time frame of test: 20 minutes  NST Interpretation:          ROS -   Patient Reports : see above  Patient Denies: Loss of Fluid, Vision Changes, Headaches, Nausea , Vomiting , Contractions and Epigastric pain  Fetal Movement : normal  All other systems reviewed and are negative.       The additional following portions of the patient's history were reviewed and updated as appropriate: allergies and current medications.    I have reviewed and agree with the HPI, ROS, and historical information as entered above. Claire Nguyen MD      EXAM:     Prenatal Vitals  BP: 112/78  Weight: 80.1 kg (176 lb 9.6 oz)             NST NOTE    Indiction :   AMA  FHR:          Reactive, Cat 1, No decels  Contractions:    Irregular  Time Monitored:   > 20 minutes        Urine Glucose Read-only: Negative  Urine Protein Read-only: Negative       Assessment and  Plan    Problem List Items Addressed This Visit        Gravid and     Pregnancy - Primary    Relevant Orders    POC Urinalysis Dipstick (Completed)    Group B Streptococcus Culture - Swab, Vaginal/Rectum       Musculoskeletal and Injuries    Clubfoot, congenital       Other    SGA (small for gestational age), fetal, affecting care of mother, antepartum, third trimester, other fetus      Other Visit Diagnoses     Maternal care for breech presentation, single gestation              1. Pregnancy at 36w0d  2. SGA/Club foot - continue biweekly NST. PDC to scan again on 10/10  3. Fetal status reassuring.   4. Reviewed Pre-eclampsia signs/symptoms  5. Delivery options reviewed with patient and C/S requested for 10/19 unless indicated sooner.  6. Signs of labor reviewed  7. Kick counts reviewed  8. Activity and Exercise discussed.  Return in about 4 days (around 10/1/2022).    Claire Nguyen MD  2022

## 2022-09-27 NOTE — PROGRESS NOTES
Documentation of the ultasound findings, images, and interpretations will be available in the patient's Viewpoint report located in the Chart Review Imaging tab in [employer].

## 2022-09-30 ENCOUNTER — ROUTINE PRENATAL (OUTPATIENT)
Dept: OBSTETRICS AND GYNECOLOGY | Facility: CLINIC | Age: 37
End: 2022-09-30

## 2022-09-30 VITALS — DIASTOLIC BLOOD PRESSURE: 72 MMHG | SYSTOLIC BLOOD PRESSURE: 116 MMHG | BODY MASS INDEX: 31.71 KG/M2 | WEIGHT: 179 LBS

## 2022-09-30 DIAGNOSIS — Z3A.36 36 WEEKS GESTATION OF PREGNANCY: Primary | ICD-10-CM

## 2022-09-30 LAB
BACTERIA SPEC AEROBE CULT: NORMAL
CLARITY, POC: CLEAR
COLOR UR: YELLOW
GLUCOSE UR STRIP-MCNC: NEGATIVE MG/DL
PROT UR STRIP-MCNC: NEGATIVE MG/DL

## 2022-09-30 PROCEDURE — 0502F SUBSEQUENT PRENATAL CARE: CPT | Performed by: NURSE PRACTITIONER

## 2022-09-30 PROCEDURE — 59025 FETAL NON-STRESS TEST: CPT | Performed by: NURSE PRACTITIONER

## 2022-09-30 NOTE — PROGRESS NOTES
OB FOLLOW UP  CC- Here for care of pregnancy        Katy Jackson is a 37 y.o.  36w3d patient being seen today for her obstetrical follow up visit. Patient reports cramping, contractions, and tightness.    Her prenatal care is complicated by (and status) :   Patient Active Problem List   Diagnosis   • History of loop electrical excision procedure (LEEP)   • Pregnancy   • History of abnormal cervical Papanicolaou smear   • Clubfoot, congenital   • Fall   • Placental abruption with coagulation defect in third trimester   • SGA (small for gestational age), fetal, affecting care of mother, antepartum, third trimester, other fetus       GBS Status: was already done and is negative.    No Known Allergies       Flu Status: Already given in current flu season  Her Delivery Plan is: Primary . Scheduled    US today: no  Non Stress Test: Yes minutes >30  non-stress test: inconclusive  indication: Fetal Growth Restriction (IUGR)   BPP       ROS -   Patient Reports : Cramping, Epigastric Pain and Contractions  Patient Denies: Loss of Fluid, Vaginal Spotting, Vision Changes, Headaches, Nausea  and Vomiting   Fetal Movement : normal  All other systems reviewed and are negative.       The additional following portions of the patient's history were reviewed and updated as appropriate: allergies and current medications.    I have reviewed and agree with the HPI, ROS, and historical information as entered above. Nicole Lake, APRN      EXAM:     Prenatal Vitals  BP: 116/72  Weight: 81.2 kg (179 lb)   Fetal Heart Rate: NST              Urine Glucose Read-only: Negative  Urine Protein Read-only: Negative       Assessment and Plan    Problem List Items Addressed This Visit        Gravid and     Pregnancy - Primary    Relevant Orders    POC Urinalysis Dipstick (Completed)    US Fetal Biophysical Profile;With Non-Stress Testing          1. Pregnancy at 36w3d  2. Fetal status reassuring.  NST inconclusive. BPP 8/8. 8/10 total  3. Reviewed Pre-eclampsia signs/symptoms  4. Signs of labor reviewed  5. Kick counts reviewed  6. Activity and Exercise discussed.  7. FU 10/4/22 NST and see ELI Lake, APRN  09/30/2022

## 2022-10-04 ENCOUNTER — ROUTINE PRENATAL (OUTPATIENT)
Dept: OBSTETRICS AND GYNECOLOGY | Facility: CLINIC | Age: 37
End: 2022-10-04

## 2022-10-04 VITALS — BODY MASS INDEX: 32.06 KG/M2 | WEIGHT: 181 LBS | SYSTOLIC BLOOD PRESSURE: 124 MMHG | DIASTOLIC BLOOD PRESSURE: 72 MMHG

## 2022-10-04 DIAGNOSIS — Z3A.37 37 WEEKS GESTATION OF PREGNANCY: Primary | ICD-10-CM

## 2022-10-04 LAB
EXPIRATION DATE: 0
GLUCOSE UR STRIP-MCNC: NEGATIVE MG/DL
Lab: 0
PROT UR STRIP-MCNC: NEGATIVE MG/DL

## 2022-10-04 PROCEDURE — 0502F SUBSEQUENT PRENATAL CARE: CPT

## 2022-10-04 PROCEDURE — 59025 FETAL NON-STRESS TEST: CPT

## 2022-10-04 NOTE — PROGRESS NOTES
OB FOLLOW UP  CC- Here for care of pregnancy        Katy Jackson is a 37 y.o.  37w0d patient being seen today for her obstetrical follow up visit. Patient reports no complaints.     Her prenatal care is complicated by (and status) : SGA  Patient Active Problem List   Diagnosis   • History of loop electrical excision procedure (LEEP)   • Pregnancy   • History of abnormal cervical Papanicolaou smear   • Clubfoot, congenital   • Fall   • Placental abruption with coagulation defect in third trimester   • SGA (small for gestational age), fetal, affecting care of mother, antepartum, third trimester, other fetus       GBS Status:   Group B Strep Culture   Date Value Ref Range Status   2022 No Group B Streptococcus isolated  Final         No Known Allergies       US today: no  Reason for test:    SGA  Date of Test: 10/5/2022  Time frame of test: > 20 mins  RN NST Interpretation:  Reactive        ROS -   Patient Reports : No Problems  Patient Denies: Loss of Fluid, Vaginal Spotting, Vision Changes, Headaches, Nausea , Vomiting , Contractions and Epigastric pain  Fetal Movement : normal  All other systems reviewed and are negative.       The additional following portions of the patient's history were reviewed and updated as appropriate: allergies and current medications.    I have reviewed and agree with the HPI, ROS, and historical information as entered above. Yoselyn Lyons, APRN      EXAM:     Prenatal Vitals  BP: 124/72  Weight: 82.1 kg (181 lb)   Fetal Heart Rate: NST          Urine Glucose Read-only: Negative  Urine Protein Read-only: Negative       Assessment and Plan    Problem List Items Addressed This Visit        Gravid and     Pregnancy - Primary    Relevant Orders    POC Glucose, Urine, Qualitative, Dipstick (Completed)    POC Protein, Urine, Qualitative, Dipstick (Completed)          1. Pregnancy at 37w0d  2. Fetal status reassuring.   3. C/S scheduled  10/19/2022  4. Reviewed Pre-eclampsia signs/symptoms  5. Signs of labor reviewed  6. Kick counts reviewed  7. Activity and Exercise discussed.  Return in about 3 days (around 10/7/2022) for ALEXANDREA SAEZ &NST.    Yoselyn Lyons, APRN  10/04/2022

## 2022-10-07 ENCOUNTER — ROUTINE PRENATAL (OUTPATIENT)
Dept: OBSTETRICS AND GYNECOLOGY | Facility: CLINIC | Age: 37
End: 2022-10-07

## 2022-10-07 VITALS — WEIGHT: 181.6 LBS | DIASTOLIC BLOOD PRESSURE: 78 MMHG | BODY MASS INDEX: 32.17 KG/M2 | SYSTOLIC BLOOD PRESSURE: 130 MMHG

## 2022-10-07 DIAGNOSIS — O36.5939 SGA (SMALL FOR GESTATIONAL AGE), FETAL, AFFECTING CARE OF MOTHER, ANTEPARTUM, THIRD TRIMESTER, OTHER FETUS: ICD-10-CM

## 2022-10-07 DIAGNOSIS — Z3A.37 37 WEEKS GESTATION OF PREGNANCY: Primary | ICD-10-CM

## 2022-10-07 LAB
EXPIRATION DATE: 0
GLUCOSE UR STRIP-MCNC: NEGATIVE MG/DL
Lab: 0
PROT UR STRIP-MCNC: NEGATIVE MG/DL

## 2022-10-07 PROCEDURE — 0502F SUBSEQUENT PRENATAL CARE: CPT | Performed by: NURSE PRACTITIONER

## 2022-10-07 PROCEDURE — 59025 FETAL NON-STRESS TEST: CPT | Performed by: NURSE PRACTITIONER

## 2022-10-07 NOTE — PROGRESS NOTES
OB FOLLOW UP  CC- Here for care of pregnancy        Katy Jackson is a 37 y.o.  37w3d patient being seen today for her obstetrical follow up visit. Patient reports no complaints..     Her prenatal care is complicated by (and status) :  Patient Active Problem List   Diagnosis   • History of loop electrical excision procedure (LEEP)   • Pregnancy   • History of abnormal cervical Papanicolaou smear   • Clubfoot, congenital   • Fall   • Placental abruption with coagulation defect in third trimester   • SGA (small for gestational age), fetal, affecting care of mother, antepartum, third trimester, other fetus       GBS Status:   Group B Strep Culture   Date Value Ref Range Status   2022 No Group B Streptococcus isolated  Final         No Known Allergies       Her Delivery Plan is: c/s 10/19/22     History of COVID: N/A Seen by faculty.  US today: no  Non Stress Test: Yes  Reason for test: Intrauterine growth restriction  Date of Test: 10/7/2022  Time frame of test: > 20 minutes  NST Interpretation: Reactive    ROS -   Patient Reports : No Problems  Patient Denies: Loss of Fluid, Vaginal Spotting, Vision Changes, Headaches, Nausea , Vomiting , Contractions and Epigastric pain  Fetal Movement : normal  All other systems reviewed and are negative.       The additional following portions of the patient's history were reviewed and updated as appropriate: allergies, current medications, past family history, past medical history, past social history, past surgical history and problem list.    I have reviewed and agree with the HPI, ROS, and historical information as entered above. Nicole Lake, APRN      EXAM:     Prenatal Vitals  BP: 130/78  Weight: 82.4 kg (181 lb 9.6 oz)   Fetal Heart Rate: +              Urine Glucose Read-only: Negative  Urine Protein Read-only: Negative       Assessment and Plan    Problem List Items Addressed This Visit        Gravid and     Pregnancy -  Primary    Relevant Orders    POC Glucose, Urine, Qualitative, Dipstick (Completed)    POC Protein, Urine, Qualitative, Dipstick (Completed)       Other    SGA (small for gestational age), fetal, affecting care of mother, antepartum, third trimester, other fetus    Current Assessment & Plan     PDC scan 10/10/22               1. Pregnancy at 37w3d  2. Fetal status reassuring.   3. Reviewed Pre-eclampsia signs/symptoms  4. Delivery options reviewed with patient  5. Signs of labor reviewed  6. Kick counts reviewed  7. Activity and Exercise discussed.  8. FU with PDC 10/10/22  9. Scheduled CS 10/19/22  Return in about 4 days (around 10/11/2022) for NST.    Nicole Lake, APRN  10/07/2022

## 2022-10-10 ENCOUNTER — HOSPITAL ENCOUNTER (INPATIENT)
Facility: HOSPITAL | Age: 37
LOS: 3 days | Discharge: HOME | End: 2022-10-13
Attending: OBSTETRICS & GYNECOLOGY | Admitting: OBSTETRICS & GYNECOLOGY
Payer: COMMERCIAL

## 2022-10-10 ENCOUNTER — ANESTHESIA EVENT (OUTPATIENT)
Dept: LABOR AND DELIVERY | Facility: HOSPITAL | Age: 37
End: 2022-10-10

## 2022-10-10 ENCOUNTER — OFFICE VISIT (OUTPATIENT)
Dept: OBSTETRICS AND GYNECOLOGY | Facility: HOSPITAL | Age: 37
End: 2022-10-10

## 2022-10-10 ENCOUNTER — HOSPITAL ENCOUNTER (OUTPATIENT)
Dept: WOMENS IMAGING | Facility: HOSPITAL | Age: 37
Discharge: HOME | End: 2022-10-10
Admitting: OBSTETRICS & GYNECOLOGY

## 2022-10-10 ENCOUNTER — ANESTHESIA (OUTPATIENT)
Dept: LABOR AND DELIVERY | Facility: HOSPITAL | Age: 37
End: 2022-10-10

## 2022-10-10 VITALS — BODY MASS INDEX: 31.92 KG/M2 | DIASTOLIC BLOOD PRESSURE: 82 MMHG | SYSTOLIC BLOOD PRESSURE: 127 MMHG | WEIGHT: 180.2 LBS

## 2022-10-10 DIAGNOSIS — Q66.89 CLUBFOOT, CONGENITAL: Primary | ICD-10-CM

## 2022-10-10 DIAGNOSIS — O36.5939 SGA (SMALL FOR GESTATIONAL AGE), FETAL, AFFECTING CARE OF MOTHER, ANTEPARTUM, THIRD TRIMESTER, OTHER FETUS: ICD-10-CM

## 2022-10-10 DIAGNOSIS — Z3A.34 34 WEEKS GESTATION OF PREGNANCY: ICD-10-CM

## 2022-10-10 DIAGNOSIS — Q66.89 CLUBFOOT, CONGENITAL: ICD-10-CM

## 2022-10-10 DIAGNOSIS — Z34.90 PREGNANCY, UNSPECIFIED GESTATIONAL AGE: ICD-10-CM

## 2022-10-10 DIAGNOSIS — O45.003: ICD-10-CM

## 2022-10-10 DIAGNOSIS — O26.849 UTERINE SIZE DATE DISCREPANCY PREGNANCY: ICD-10-CM

## 2022-10-10 LAB
ABO GROUP BLD: NORMAL
ABO GROUP BLD: NORMAL
ATMOSPHERIC PRESS: ABNORMAL MM[HG]
ATMOSPHERIC PRESS: ABNORMAL MM[HG]
BASE EXCESS BLDCOA CALC-SCNC: -8.8 MMOL/L (ref 0–2)
BASE EXCESS BLDCOV CALC-SCNC: -5 MMOL/L (ref 0–2)
BDY SITE: ABNORMAL
BDY SITE: ABNORMAL
BLD GP AB SCN SERPL QL: NEGATIVE
BODY TEMPERATURE: 37 C
BODY TEMPERATURE: 37 C
CO2 BLDA-SCNC: 21.7 MMOL/L (ref 22–33)
CO2 BLDA-SCNC: 24.1 MMOL/L (ref 22–33)
COLLECT TME SMN: ABNORMAL
DEPRECATED RDW RBC AUTO: 39.4 FL (ref 37–54)
EPAP: 0
EPAP: 0
ERYTHROCYTE [DISTWIDTH] IN BLOOD BY AUTOMATED COUNT: 11.9 % (ref 12.3–15.4)
GLUCOSE BLDC GLUCOMTR-MCNC: 82 MG/DL (ref 70–130)
HCO3 BLDCOA-SCNC: 20.1 MMOL/L (ref 16.9–20.5)
HCO3 BLDCOV-SCNC: 22.5 MMOL/L (ref 18.6–21.4)
HCT VFR BLD AUTO: 38.3 % (ref 34–46.6)
HGB BLD-MCNC: 13.8 G/DL (ref 12–15.9)
HGB BLDA-MCNC: 17.9 G/DL (ref 14–18)
HGB BLDA-MCNC: 17.9 G/DL (ref 14–18)
INHALED O2 CONCENTRATION: 21 %
INHALED O2 CONCENTRATION: 21 %
IPAP: 0
IPAP: 0
Lab: ABNORMAL
MCH RBC QN AUTO: 32.9 PG (ref 26.6–33)
MCHC RBC AUTO-ENTMCNC: 36 G/DL (ref 31.5–35.7)
MCV RBC AUTO: 91.2 FL (ref 79–97)
MODALITY: ABNORMAL
MODALITY: ABNORMAL
NOTE: ABNORMAL
NOTE: ABNORMAL
NOTIFIED BY: ABNORMAL
NOTIFIED WHO: ABNORMAL
PAW @ PEAK INSP FLOW SETTING VENT: 0 CMH2O
PAW @ PEAK INSP FLOW SETTING VENT: 0 CMH2O
PCO2 BLDCOA: 53 MMHG (ref 43.3–54.9)
PCO2 BLDCOV: 49.3 MM HG (ref 28–40)
PH BLDCOA: 7.19 PH UNITS (ref 7.22–7.3)
PH BLDCOV: 7.27 PH UNITS (ref 7.31–7.37)
PLATELET # BLD AUTO: 256 10*3/MM3 (ref 140–450)
PMV BLD AUTO: 10.5 FL (ref 6–12)
PO2 BLDCOA: 23.2 MMHG (ref 11.5–43.3)
PO2 BLDCOV: 15.1 MM HG (ref 21–31)
RBC # BLD AUTO: 4.2 10*6/MM3 (ref 3.77–5.28)
RH BLD: POSITIVE
RH BLD: POSITIVE
SAO2 % BLDCOA: ABNORMAL %
T&S EXPIRATION DATE: NORMAL
TOTAL RATE: 0 BREATHS/MINUTE
TOTAL RATE: 0 BREATHS/MINUTE
VENTILATOR MODE: ABNORMAL
WBC NRBC COR # BLD: 13.08 10*3/MM3 (ref 3.4–10.8)

## 2022-10-10 PROCEDURE — 86900 BLOOD TYPING SEROLOGIC ABO: CPT

## 2022-10-10 PROCEDURE — 86900 BLOOD TYPING SEROLOGIC ABO: CPT | Performed by: OBSTETRICS & GYNECOLOGY

## 2022-10-10 PROCEDURE — 76819 FETAL BIOPHYS PROFIL W/O NST: CPT | Performed by: OBSTETRICS & GYNECOLOGY

## 2022-10-10 PROCEDURE — 76816 OB US FOLLOW-UP PER FETUS: CPT

## 2022-10-10 PROCEDURE — 86850 RBC ANTIBODY SCREEN: CPT | Performed by: OBSTETRICS & GYNECOLOGY

## 2022-10-10 PROCEDURE — 82805 BLOOD GASES W/O2 SATURATION: CPT

## 2022-10-10 PROCEDURE — 76820 UMBILICAL ARTERY ECHO: CPT

## 2022-10-10 PROCEDURE — 86901 BLOOD TYPING SEROLOGIC RH(D): CPT

## 2022-10-10 PROCEDURE — 25010000002 KETOROLAC TROMETHAMINE PER 15 MG: Performed by: OBSTETRICS & GYNECOLOGY

## 2022-10-10 PROCEDURE — 25010000002 CEFAZOLIN IN DEXTROSE 2-4 GM/100ML-% SOLUTION: Performed by: OBSTETRICS & GYNECOLOGY

## 2022-10-10 PROCEDURE — S0260 H&P FOR SURGERY: HCPCS | Performed by: OBSTETRICS & GYNECOLOGY

## 2022-10-10 PROCEDURE — 25010000002 FENTANYL CITRATE (PF) 50 MCG/ML SOLUTION: Performed by: ANESTHESIOLOGY

## 2022-10-10 PROCEDURE — 85027 COMPLETE CBC AUTOMATED: CPT | Performed by: OBSTETRICS & GYNECOLOGY

## 2022-10-10 PROCEDURE — 0 MORPHINE PER 10 MG: Performed by: ANESTHESIOLOGY

## 2022-10-10 PROCEDURE — 82962 GLUCOSE BLOOD TEST: CPT

## 2022-10-10 PROCEDURE — 76820 UMBILICAL ARTERY ECHO: CPT | Performed by: OBSTETRICS & GYNECOLOGY

## 2022-10-10 PROCEDURE — 25010000002 MIDAZOLAM PER 1 MG: Performed by: ANESTHESIOLOGY

## 2022-10-10 PROCEDURE — 25010000002 METOCLOPRAMIDE PER 10 MG: Performed by: ANESTHESIOLOGY

## 2022-10-10 PROCEDURE — 76816 OB US FOLLOW-UP PER FETUS: CPT | Performed by: OBSTETRICS & GYNECOLOGY

## 2022-10-10 PROCEDURE — 86901 BLOOD TYPING SEROLOGIC RH(D): CPT | Performed by: OBSTETRICS & GYNECOLOGY

## 2022-10-10 PROCEDURE — 76819 FETAL BIOPHYS PROFIL W/O NST: CPT

## 2022-10-10 PROCEDURE — 25010000002 ONDANSETRON PER 1 MG: Performed by: ANESTHESIOLOGY

## 2022-10-10 PROCEDURE — 59510 CESAREAN DELIVERY: CPT | Performed by: OBSTETRICS & GYNECOLOGY

## 2022-10-10 RX ORDER — DOCUSATE SODIUM 100 MG/1
100 CAPSULE, LIQUID FILLED ORAL 2 TIMES DAILY PRN
Status: DISCONTINUED | OUTPATIENT
Start: 2022-10-10 | End: 2022-10-13 | Stop reason: HOSPADM

## 2022-10-10 RX ORDER — OXYTOCIN/0.9 % SODIUM CHLORIDE 30/500 ML
PLASTIC BAG, INJECTION (ML) INTRAVENOUS AS NEEDED
Status: DISCONTINUED | OUTPATIENT
Start: 2022-10-10 | End: 2022-10-10 | Stop reason: SURG

## 2022-10-10 RX ORDER — CARBOPROST TROMETHAMINE 250 UG/ML
250 INJECTION, SOLUTION INTRAMUSCULAR AS NEEDED
Status: DISCONTINUED | OUTPATIENT
Start: 2022-10-10 | End: 2022-10-13 | Stop reason: HOSPADM

## 2022-10-10 RX ORDER — ACETAMINOPHEN 500 MG
1000 TABLET ORAL ONCE
Status: COMPLETED | OUTPATIENT
Start: 2022-10-10 | End: 2022-10-10

## 2022-10-10 RX ORDER — CITRIC ACID/SODIUM CITRATE 334-500MG
30 SOLUTION, ORAL ORAL ONCE
Status: COMPLETED | OUTPATIENT
Start: 2022-10-10 | End: 2022-10-10

## 2022-10-10 RX ORDER — LIDOCAINE HYDROCHLORIDE 10 MG/ML
5 INJECTION, SOLUTION EPIDURAL; INFILTRATION; INTRACAUDAL; PERINEURAL AS NEEDED
Status: DISCONTINUED | OUTPATIENT
Start: 2022-10-10 | End: 2022-10-10 | Stop reason: HOSPADM

## 2022-10-10 RX ORDER — KETOROLAC TROMETHAMINE 15 MG/ML
15 INJECTION, SOLUTION INTRAMUSCULAR; INTRAVENOUS EVERY 6 HOURS
Status: COMPLETED | OUTPATIENT
Start: 2022-10-11 | End: 2022-10-11

## 2022-10-10 RX ORDER — CARBOPROST TROMETHAMINE 250 UG/ML
250 INJECTION, SOLUTION INTRAMUSCULAR AS NEEDED
Status: DISCONTINUED | OUTPATIENT
Start: 2022-10-10 | End: 2022-10-10 | Stop reason: HOSPADM

## 2022-10-10 RX ORDER — HYDROCORTISONE 25 MG/G
1 CREAM TOPICAL AS NEEDED
Status: DISCONTINUED | OUTPATIENT
Start: 2022-10-10 | End: 2022-10-13 | Stop reason: HOSPADM

## 2022-10-10 RX ORDER — SODIUM CHLORIDE, SODIUM LACTATE, POTASSIUM CHLORIDE, CALCIUM CHLORIDE 600; 310; 30; 20 MG/100ML; MG/100ML; MG/100ML; MG/100ML
INJECTION, SOLUTION INTRAVENOUS CONTINUOUS PRN
Status: DISCONTINUED | OUTPATIENT
Start: 2022-10-10 | End: 2022-10-10 | Stop reason: SURG

## 2022-10-10 RX ORDER — BUPIVACAINE HCL/0.9 % NACL/PF 0.125 %
PLASTIC BAG, INJECTION (ML) EPIDURAL AS NEEDED
Status: DISCONTINUED | OUTPATIENT
Start: 2022-10-10 | End: 2022-10-10 | Stop reason: SURG

## 2022-10-10 RX ORDER — SODIUM CHLORIDE 0.9 % (FLUSH) 0.9 %
3 SYRINGE (ML) INJECTION EVERY 12 HOURS SCHEDULED
Status: DISCONTINUED | OUTPATIENT
Start: 2022-10-10 | End: 2022-10-10 | Stop reason: HOSPADM

## 2022-10-10 RX ORDER — SODIUM CHLORIDE 0.9 % (FLUSH) 0.9 %
10 SYRINGE (ML) INJECTION EVERY 12 HOURS SCHEDULED
Status: DISCONTINUED | OUTPATIENT
Start: 2022-10-10 | End: 2022-10-13 | Stop reason: HOSPADM

## 2022-10-10 RX ORDER — ONDANSETRON 2 MG/ML
INJECTION INTRAMUSCULAR; INTRAVENOUS AS NEEDED
Status: DISCONTINUED | OUTPATIENT
Start: 2022-10-10 | End: 2022-10-10 | Stop reason: SURG

## 2022-10-10 RX ORDER — OXYCODONE HYDROCHLORIDE 5 MG/1
10 TABLET ORAL EVERY 4 HOURS PRN
Status: DISCONTINUED | OUTPATIENT
Start: 2022-10-10 | End: 2022-10-13 | Stop reason: HOSPADM

## 2022-10-10 RX ORDER — BUPIVACAINE HYDROCHLORIDE 7.5 MG/ML
INJECTION, SOLUTION INTRASPINAL AS NEEDED
Status: DISCONTINUED | OUTPATIENT
Start: 2022-10-10 | End: 2022-10-10 | Stop reason: SURG

## 2022-10-10 RX ORDER — IBUPROFEN 600 MG/1
600 TABLET, FILM COATED ORAL EVERY 6 HOURS
Status: DISCONTINUED | OUTPATIENT
Start: 2022-10-12 | End: 2022-10-13 | Stop reason: HOSPADM

## 2022-10-10 RX ORDER — ACETAMINOPHEN 500 MG
1000 TABLET ORAL EVERY 6 HOURS
Status: COMPLETED | OUTPATIENT
Start: 2022-10-10 | End: 2022-10-11

## 2022-10-10 RX ORDER — ACETAMINOPHEN 325 MG/1
650 TABLET ORAL EVERY 6 HOURS
Status: DISCONTINUED | OUTPATIENT
Start: 2022-10-11 | End: 2022-10-13 | Stop reason: HOSPADM

## 2022-10-10 RX ORDER — SIMETHICONE 80 MG
80 TABLET,CHEWABLE ORAL 4 TIMES DAILY PRN
Status: DISCONTINUED | OUTPATIENT
Start: 2022-10-10 | End: 2022-10-13 | Stop reason: HOSPADM

## 2022-10-10 RX ORDER — SODIUM CHLORIDE 0.9 % (FLUSH) 0.9 %
10 SYRINGE (ML) INJECTION AS NEEDED
Status: DISCONTINUED | OUTPATIENT
Start: 2022-10-10 | End: 2022-10-10 | Stop reason: HOSPADM

## 2022-10-10 RX ORDER — OXYCODONE HYDROCHLORIDE 5 MG/1
5 TABLET ORAL EVERY 4 HOURS PRN
Status: DISCONTINUED | OUTPATIENT
Start: 2022-10-10 | End: 2022-10-13 | Stop reason: HOSPADM

## 2022-10-10 RX ORDER — METOCLOPRAMIDE HYDROCHLORIDE 5 MG/ML
INJECTION INTRAMUSCULAR; INTRAVENOUS AS NEEDED
Status: DISCONTINUED | OUTPATIENT
Start: 2022-10-10 | End: 2022-10-10 | Stop reason: SURG

## 2022-10-10 RX ORDER — MORPHINE SULFATE 0.5 MG/ML
INJECTION, SOLUTION EPIDURAL; INTRATHECAL; INTRAVENOUS AS NEEDED
Status: DISCONTINUED | OUTPATIENT
Start: 2022-10-10 | End: 2022-10-10 | Stop reason: SURG

## 2022-10-10 RX ORDER — CALCIUM CARBONATE 500 MG/1
1 TABLET, CHEWABLE ORAL EVERY 4 HOURS PRN
Status: DISCONTINUED | OUTPATIENT
Start: 2022-10-10 | End: 2022-10-13 | Stop reason: HOSPADM

## 2022-10-10 RX ORDER — METHYLERGONOVINE MALEATE 0.2 MG/ML
200 INJECTION INTRAVENOUS ONCE AS NEEDED
Status: DISCONTINUED | OUTPATIENT
Start: 2022-10-10 | End: 2022-10-10 | Stop reason: HOSPADM

## 2022-10-10 RX ORDER — MISOPROSTOL 200 UG/1
800 TABLET ORAL AS NEEDED
Status: DISCONTINUED | OUTPATIENT
Start: 2022-10-10 | End: 2022-10-10 | Stop reason: HOSPADM

## 2022-10-10 RX ORDER — FAMOTIDINE 10 MG/ML
INJECTION, SOLUTION INTRAVENOUS AS NEEDED
Status: DISCONTINUED | OUTPATIENT
Start: 2022-10-10 | End: 2022-10-10 | Stop reason: SURG

## 2022-10-10 RX ORDER — MIDAZOLAM HYDROCHLORIDE 1 MG/ML
INJECTION INTRAMUSCULAR; INTRAVENOUS AS NEEDED
Status: DISCONTINUED | OUTPATIENT
Start: 2022-10-10 | End: 2022-10-10 | Stop reason: SURG

## 2022-10-10 RX ORDER — METHYLERGONOVINE MALEATE 0.2 MG/ML
200 INJECTION INTRAVENOUS AS NEEDED
Status: DISCONTINUED | OUTPATIENT
Start: 2022-10-10 | End: 2022-10-13 | Stop reason: HOSPADM

## 2022-10-10 RX ORDER — FENTANYL CITRATE 50 UG/ML
INJECTION, SOLUTION INTRAMUSCULAR; INTRAVENOUS AS NEEDED
Status: DISCONTINUED | OUTPATIENT
Start: 2022-10-10 | End: 2022-10-10 | Stop reason: SURG

## 2022-10-10 RX ORDER — SODIUM CHLORIDE 0.9 % (FLUSH) 0.9 %
1-10 SYRINGE (ML) INJECTION AS NEEDED
Status: DISCONTINUED | OUTPATIENT
Start: 2022-10-10 | End: 2022-10-13 | Stop reason: HOSPADM

## 2022-10-10 RX ORDER — PRENATAL VIT/IRON FUM/FOLIC AC 27MG-0.8MG
1 TABLET ORAL DAILY
Status: DISCONTINUED | OUTPATIENT
Start: 2022-10-10 | End: 2022-10-13 | Stop reason: HOSPADM

## 2022-10-10 RX ORDER — NALBUPHINE HYDROCHLORIDE 10 MG/ML
3 INJECTION INTRAMUSCULAR; INTRAVENOUS; SUBCUTANEOUS EVERY 4 HOURS PRN
Status: ACTIVE | OUTPATIENT
Start: 2022-10-10 | End: 2022-10-11

## 2022-10-10 RX ORDER — SODIUM CHLORIDE, SODIUM LACTATE, POTASSIUM CHLORIDE, CALCIUM CHLORIDE 600; 310; 30; 20 MG/100ML; MG/100ML; MG/100ML; MG/100ML
125 INJECTION, SOLUTION INTRAVENOUS CONTINUOUS
Status: DISCONTINUED | OUTPATIENT
Start: 2022-10-10 | End: 2022-10-13 | Stop reason: HOSPADM

## 2022-10-10 RX ORDER — ALUMINA, MAGNESIA, AND SIMETHICONE 2400; 2400; 240 MG/30ML; MG/30ML; MG/30ML
15 SUSPENSION ORAL EVERY 4 HOURS PRN
Status: DISCONTINUED | OUTPATIENT
Start: 2022-10-10 | End: 2022-10-13 | Stop reason: HOSPADM

## 2022-10-10 RX ORDER — KETOROLAC TROMETHAMINE 30 MG/ML
30 INJECTION, SOLUTION INTRAMUSCULAR; INTRAVENOUS ONCE
Status: COMPLETED | OUTPATIENT
Start: 2022-10-10 | End: 2022-10-10

## 2022-10-10 RX ORDER — HYDROMORPHONE HYDROCHLORIDE 1 MG/ML
0.5 INJECTION, SOLUTION INTRAMUSCULAR; INTRAVENOUS; SUBCUTANEOUS
Status: ACTIVE | OUTPATIENT
Start: 2022-10-10 | End: 2022-10-11

## 2022-10-10 RX ORDER — CEFAZOLIN SODIUM 2 G/100ML
2 INJECTION, SOLUTION INTRAVENOUS ONCE
Status: COMPLETED | OUTPATIENT
Start: 2022-10-10 | End: 2022-10-10

## 2022-10-10 RX ORDER — ONDANSETRON 4 MG/1
4 TABLET, FILM COATED ORAL EVERY 8 HOURS PRN
Status: DISCONTINUED | OUTPATIENT
Start: 2022-10-10 | End: 2022-10-13 | Stop reason: HOSPADM

## 2022-10-10 RX ORDER — MISOPROSTOL 200 UG/1
600 TABLET ORAL AS NEEDED
Status: DISCONTINUED | OUTPATIENT
Start: 2022-10-10 | End: 2022-10-13 | Stop reason: HOSPADM

## 2022-10-10 RX ADMIN — CEFAZOLIN SODIUM 2 G: 2 INJECTION, SOLUTION INTRAVENOUS at 15:54

## 2022-10-10 RX ADMIN — DOCUSATE SODIUM 100 MG: 100 CAPSULE, LIQUID FILLED ORAL at 21:47

## 2022-10-10 RX ADMIN — MIDAZOLAM 1 MG: 1 INJECTION INTRAMUSCULAR; INTRAVENOUS at 16:51

## 2022-10-10 RX ADMIN — BUPIVACAINE HYDROCHLORIDE IN DEXTROSE 1.5 ML: 7.5 INJECTION, SOLUTION SUBARACHNOID at 16:18

## 2022-10-10 RX ADMIN — SODIUM CHLORIDE, POTASSIUM CHLORIDE, SODIUM LACTATE AND CALCIUM CHLORIDE 1000 ML: 600; 310; 30; 20 INJECTION, SOLUTION INTRAVENOUS at 15:54

## 2022-10-10 RX ADMIN — Medication 200 MCG: at 16:36

## 2022-10-10 RX ADMIN — ACETAMINOPHEN 1000 MG: 500 TABLET, FILM COATED ORAL at 21:47

## 2022-10-10 RX ADMIN — SODIUM CHLORIDE, POTASSIUM CHLORIDE, SODIUM LACTATE AND CALCIUM CHLORIDE: 600; 310; 30; 20 INJECTION, SOLUTION INTRAVENOUS at 16:05

## 2022-10-10 RX ADMIN — MORPHINE SULFATE 0.15 MG: 0.5 INJECTION, SOLUTION EPIDURAL; INTRATHECAL; INTRAVENOUS at 16:18

## 2022-10-10 RX ADMIN — SODIUM CHLORIDE, POTASSIUM CHLORIDE, SODIUM LACTATE AND CALCIUM CHLORIDE 125 ML/HR: 600; 310; 30; 20 INJECTION, SOLUTION INTRAVENOUS at 11:35

## 2022-10-10 RX ADMIN — SODIUM CHLORIDE, POTASSIUM CHLORIDE, SODIUM LACTATE AND CALCIUM CHLORIDE 125 ML/HR: 600; 310; 30; 20 INJECTION, SOLUTION INTRAVENOUS at 17:46

## 2022-10-10 RX ADMIN — FAMOTIDINE 20 MG: 10 INJECTION, SOLUTION INTRAVENOUS at 16:07

## 2022-10-10 RX ADMIN — FENTANYL CITRATE 20 MCG: 50 INJECTION, SOLUTION INTRAMUSCULAR; INTRAVENOUS at 16:18

## 2022-10-10 RX ADMIN — SODIUM CITRATE AND CITRIC ACID MONOHYDRATE 30 ML: 500; 334 SOLUTION ORAL at 15:54

## 2022-10-10 RX ADMIN — SODIUM CHLORIDE, POTASSIUM CHLORIDE, SODIUM LACTATE AND CALCIUM CHLORIDE 125 ML/HR: 600; 310; 30; 20 INJECTION, SOLUTION INTRAVENOUS at 18:39

## 2022-10-10 RX ADMIN — KETOROLAC TROMETHAMINE 30 MG: 30 INJECTION, SOLUTION INTRAMUSCULAR; INTRAVENOUS at 18:07

## 2022-10-10 RX ADMIN — Medication 200 MCG: at 16:46

## 2022-10-10 RX ADMIN — ONDANSETRON 4 MG: 2 INJECTION INTRAMUSCULAR; INTRAVENOUS at 16:07

## 2022-10-10 RX ADMIN — ACETAMINOPHEN 1000 MG: 500 TABLET, FILM COATED ORAL at 15:26

## 2022-10-10 RX ADMIN — MIDAZOLAM 1 MG: 1 INJECTION INTRAMUSCULAR; INTRAVENOUS at 16:07

## 2022-10-10 RX ADMIN — Medication 500 ML: at 16:43

## 2022-10-10 RX ADMIN — METOCLOPRAMIDE 10 MG: 5 INJECTION, SOLUTION INTRAMUSCULAR; INTRAVENOUS at 16:07

## 2022-10-10 RX ADMIN — Medication 200 MCG: at 16:29

## 2022-10-10 NOTE — ANESTHESIA PROCEDURE NOTES
Spinal Block      Patient reassessed immediately prior to procedure    Patient location during procedure: OB  Performed By  Anesthesiologist: Deny Garcia DO  Preanesthetic Checklist  Completed: patient identified, IV checked, site marked, risks and benefits discussed, surgical consent, monitors and equipment checked, pre-op evaluation and timeout performed  Spinal Block Prep:  Patient Position:sitting  Sterile Tech:cap, gloves, mask and sterile barriers  Prep:Chloraprep  Patient Monitoring:blood pressure monitoring, continuous pulse oximetry and EKG    Spinal Block Procedure  Approach:midline  Guidance:landmark technique and palpation technique  Location:L3-L4  Needle Type:Amy  Needle Gauge:25 G  Placement of Spinal needle event:cerebrospinal fluid aspirated  Paresthesia: no  Fluid Appearance:clear     Post Assessment  Patient Tolerance:patient tolerated the procedure well with no apparent complications  Complications no

## 2022-10-10 NOTE — PLAN OF CARE
Goal Outcome Evaluation:  Plan of Care Reviewed With: patient, spouse        Progress: no change  Outcome Evaluation: Pt arrived on the unit at approximately 1830. Vitals WNL. Pt denies pain. She reports BLE numbness, limiting mobility. Skin glue to incision is dry and intact with a small amount of dried drainage present. Fundus is U/U, firm, and midline. Light rubra present.

## 2022-10-10 NOTE — H&P
TIA Krishna  Obstetric History and Physical    CC: The patient has no complaints    Subjective     Patient is a 37 y.o. female  currently at 37w6d, who presents with BPP 4/8 and oligohydramnios on ultrasound.  Baby with bilateral clubbed feet, possible skeletal dysplasia.    Her prenatal care is complicated by  advanced maternal age  genetic screening was normal.  Her previous obstetric/gynecological history is noted for is non-contributory.    The following portions of the patients history were reviewed and updated as appropriate: current medications, allergies, past medical history, past surgical history, past family history, past social history and problem list .       Prenatal Information:  Prenatal Results     POC Urine Glucose/Protein     Test Value Reference Range Date Time    Urine Glucose  Negative mg/dL Negative 10/07/22 1530    Urine Protein  Negative mg/dL Negative 10/07/22 1530          Initial Prenatal Labs     Test Value Reference Range Date Time    Hemoglobin  13.0 g/dL 11.1 - 15.9 03/15/22 1618    Hematocrit  38.2 % 34.0 - 46.6 03/15/22 1618    Platelets  317 x10E3/uL 150 - 450 03/15/22 1618    Rubella IgG  3.76 index Immune >0.99 03/15/22 1618    Hepatitis B SAg  Negative  Negative 03/15/22 1618    Hepatitis C Ab  <0.1 s/co ratio 0.0 - 0.9 03/15/22 1618    RPR  Non Reactive  Non Reactive 03/15/22 1618    ABO  O   10/10/22 1233    Rh  Positive   10/10/22 1233    Antibody Screen  Negative  Negative 03/15/22 1618    HIV  Non Reactive  Non Reactive 03/15/22 1618    Urine Culture  Final report   03/15/22 1618    Gonorrhea  Negative  Negative 03/15/22 1618    Chlamydia  Negative  Negative 03/15/22 1618    TSH        HgB A1c               2nd and 3rd Trimester     Test Value Reference Range Date Time    Hemoglobin (repeated)  13.8 g/dL 12.0 - 15.9 10/10/22 1136       13.1 g/dL 12.0 - 15.9 22 1133       12.7 g/dL 12.0 - 15.9 22 1251    Hematocrit (repeated)  38.3 % 34.0 - 46.6 10/10/22  1136       36.5 % 34.0 - 46.6 08/19/22 1133       36.1 % 34.0 - 46.6 08/09/22 1251    Platelets   256 10*3/mm3 140 - 450 10/10/22 1136       271 10*3/mm3 140 - 450 08/19/22 1133       248 10*3/mm3 140 - 450 08/09/22 1251       317 x10E3/uL 150 - 450 03/15/22 1618    GCT  141 mg/dL 65 - 139 08/09/22 1251    Antibody Screen (repeated)  Negative   10/10/22 1136       Negative  Negative 08/09/22 1251    GTT Fasting  75 mg/dL 65 - 94 08/12/22 1431    GTT 1 Hr  175 mg/dL 65 - 179 08/12/22 1431    GTT 2 Hr  141 mg/dL 65 - 154 08/12/22 1431    GTT 3 Hr  61 mg/dL 65 - 139 08/12/22 1431    Group B Strep  No Group B Streptococcus isolated   09/27/22 1915          Drug Screening     Test Value Reference Range Date Time    Amphetamine Screen  Negative ng/mL Ezwsve=1279 03/15/22 1618    Barbiturate Screen  Negative ng/mL Ifnmom=928 03/15/22 1618    Benzodiazepine Screen  Negative ng/mL Akavya=841 03/15/22 1618    Methadone Screen  Negative ng/mL Csjwzo=631 03/15/22 1618    Phencyclidine Screen  Negative ng/mL Cutoff=25 03/15/22 1618    Opiates Screen  Negative ng/mL Koqocy=018 03/15/22 1618    THC Screen  Negative ng/mL Cutoff=20 03/15/22 1618    Cocaine Screen  Negative ng/mL Nipept=427 03/15/22 1618    Propoxyphene Screen  Negative ng/mL Myjxfh=852 03/15/22 1618    Buprenorphine Screen        Methamphetamine Screen        Oxycodone Screen        Tricyclic Antidepressants Screen              Other (Risk screening)     Test Value Reference Range Date Time    Varicella IgG        Parvovirus IgG        CMV IgG        Cystic Fibrosis        Hemoglobin electrophoresis        NIPT        MSAFP-4        AFP (for NTD only)  *Screen Negative*   05/12/22 1233          Legend    ^: Historical                      External Prenatal Results     Pregnancy Outside Results - Transcribed From Office Records - See Scanned Records For Details     Test Value Date Time    ABO  O  10/10/22 1233    Rh  Positive  10/10/22 1233    Antibody Screen   Negative  10/10/22 1136       Negative  22 1251       Negative  03/15/22 1618    Varicella IgG       Rubella  3.76 index 03/15/22 1618    Hgb  13.8 g/dL 10/10/22 1136       13.1 g/dL 22 1133       12.7 g/dL 22 1251       13.0 g/dL 03/15/22 1618    Hct  38.3 % 10/10/22 1136       36.5 % 22 1133       36.1 % 22 1251       38.2 % 03/15/22 1618    Glucose Fasting GTT  75 mg/dL 22 1431    Glucose Tolerance Test 1 hour  175 mg/dL 22 1431    Glucose Tolerance Test 3 hour  61 mg/dL 22 1431    Gonorrhea (discrete)  Negative  03/15/22 1618    Chlamydia (discrete)  Negative  03/15/22 1618    RPR  Non Reactive  03/15/22 1618    VDRL       Syphilis Antibody       HBsAg  Negative  03/15/22 1618    Herpes Simplex Virus PCR       Herpes Simplex VIrus Culture       HIV  Non Reactive  03/15/22 1618    Hep C RNA Quant PCR       Hep C Antibody  <0.1 s/co ratio 03/15/22 1618    AFP  62.1 ng/mL 22 1233    Group B Strep  No Group B Streptococcus isolated  22 1915    GBS Susceptibility to Clindamycin       GBS Susceptibility to Erythromycin       Fetal Fibronectin       Genetic Testing, Maternal Blood             Drug Screening     Test Value Date Time    Urine Drug Screen       Amphetamine Screen  Negative ng/mL 03/15/22 1618    Barbiturate Screen  Negative ng/mL 03/15/22 1618    Benzodiazepine Screen  Negative ng/mL 03/15/22 1618    Methadone Screen  Negative ng/mL 03/15/22 1618    Phencyclidine Screen  Negative ng/mL 03/15/22 1618    Opiates Screen       THC Screen       Cocaine Screen       Propoxyphene Screen  Negative ng/mL 03/15/22 1618    Buprenorphine Screen       Methamphetamine Screen       Oxycodone Screen       Tricyclic Antidepressants Screen             Legend    ^: Historical                         Past OB History:     OB History    Para Term  AB Living   1 0 0 0 0 0   SAB IAB Ectopic Molar Multiple Live Births   0 0 0 0 0 0      # Outcome Date GA  Lbr King/2nd Weight Sex Delivery Anes PTL Lv   1 Current                Past Medical History: Past Medical History:   Diagnosis Date   • Abnormal Pap smear of cervix    • Anemia    • History of abnormal cervical Papanicolaou smear     cryo gyn surgery in [facility]; normal since      Past Surgical History Past Surgical History:   Procedure Laterality Date   • ADENOIDECTOMY     • GYNECOLOGIC CRYOSURGERY      in [facility]   • LEEP        Family History: Family History   Problem Relation Age of Onset   • Hypotension Father    • Hypertension Mother    • Diabetes type I Brother    • Breast cancer Paternal Grandmother       Social History:  reports that she has never smoked. She has never used smokeless tobacco.   reports that she does not currently use alcohol.   reports no history of drug use.        Review of Systems:    All other systems reviewed and negative    Objective     Vital Signs Range for the last 24 hours  Temperature: Temp:  [98.5 °F (36.9 °C)] 98.5 °F (36.9 °C)   Temp Source: Temp src: Oral   BP: BP: (127)/(82) 127/82   Pulse: Heart Rate:  [101] 101   Respirations: Resp:  [16] 16   SPO2: SpO2:  [97 %] 97 %   O2 Amount (l/min):     O2 Devices     Weight: Weight:  [81.7 kg (180 lb 3.2 oz)] 81.7 kg (180 lb 3.2 oz)     Physical Examination: General appearance - alert, well appearing, and in no distress  Neck - supple, no significant adenopathy  Abdomen - soft, nontender, nondistended, no masses or organomegaly  Musculoskeletal - no joint tenderness, deformity or swelling  Extremities - peripheral pulses normal, no pedal edema, no clubbing or cyanosis  Skin - normal coloration and turgor, no rashes, no suspicious skin lesions noted    Presentation: breech   Cervix: Exam by:     Dilation:     Effacement:     Station:       Fetal Heart Rate Assessment   Method:     Beats/min:     Baseline:     Varibility:     Accels:     Decels:     Tracing Category:       Uterine Assessment   Method:     Frequency (min):     Ctx Count  in 10 min:     Duration:     Intensity:     Intensity by IUPC:     Resting Tone:     Resting Tone by IUPC:     [place] Units:       Laboratory Results: GBS not done    Assessment & Plan       Clubfoot, congenital    SGA (small for gestational age), fetal, affecting care of mother, antepartum, third trimester, other fetus    Uterine size date discrepancy pregnancy      Assessment & Plan    Assessment:  1.  Intrauterine pregnancy at 37w6d weeks gestation with non-reassuring fetal status.  Currently with reactive continuous tracing though    Plan:  1. Delivery by primary  for breech  2. Plan of care has been reviewed with patient and   3.  Risks, benefits of treatment plan have been discussed.  4.  All questions have been answered.      Paris Morales MD  10/10/2022  13:38 EDT

## 2022-10-10 NOTE — PLAN OF CARE
Goal Outcome Evaluation:      Healthy mom, healthy baby the end of the day. Primary  section d/t breech presentation and less than 39 week delivery d/t results in PDC scan.

## 2022-10-10 NOTE — PROGRESS NOTES
Documentation of the ultasound findings, images, and interpretations will be available in the patient's Viewpoint report located in the Chart Review Imaging tab in [employer].

## 2022-10-10 NOTE — OP NOTE
Operative Note    Patient name: Katy Mott Tubbesing  YOB: 1985   MRN: [id number]  Admission Date: 10/10/2022  Referring Provider: Claire Nguyen MD    ID: 37 y.o.  at 37w6d    Preoperative Diagnosis:      Diagnosis   • History of loop electrical excision procedure (LEEP)   • Pregnancy   • History of abnormal cervical Papanicolaou smear   • Clubfoot, congenital       • Placental abruption with coagulation defect in third trimester   • SGA (small for gestational age), fetal, affecting care of mother, antepartum, third trimester, other fetus   • BPP              Postoperative Diagnosis: Same as above.  Bicornuate uterus with uterine septum.    Procedure(s): primarylow transverse  delivery     Surgeons: Surgeon(s) and Role:     * Paris Morales MD - Primary    Anesthesia: Spinal    Estimated Blood Loss: <500ml mL    Preoperative antibiotic: Ancef (cefazolin) 2 grams    Blood products:   Blood Administration Record (From admission, onward)    None          Pathology: * No orders in the log *    Drains: Yuan catheter to gravity    Complications: None    Condition: Stable to recovery room                                          Infant:                Gender: male  infant    Weight: 2739 g (6 lb 0.6 oz)     Apgars: 8  @ 1 minute /     9  @ 5 minutes    Cord gases: Venous:  No components found for:  PHCVEN,  BECVEN      Arterial:  No components found for:  PHCART,  BECART          Operative Summary:   After obtaining informed consent the patient was taken to the operating room where adequate anesthesia was obtained.  Yuan catheter was placed in the bladder preoperatively.  IV antibiotics were given preoperatively.       The abdomen was prepped and draped in the usual sterile fashion for  delivery.  After confirming adequate anesthesia a Pfannenstiel skin incision was made with the scalpel and carried through to the underlying layer of fascia.  The fascia was  incised in the midline and the incision extended laterally with the Baca scissors and with blunt dissection.       The upper aspect of the fascia was grasped with 2 Kocher clamps, elevated, and dissected off the underlying rectus muscles bluntly and with the Baca scissors.  The Kocher clamps were removed and applied to the inferior aspect of the fascia.  The fascia was dissected off of the rectus muscles in the same fashion.  The peritoneum was entered bluntly.  The incision was stretched and the bladder blade and Vigil retractor inserted for visualization of the uterus.       The uterus was incised with the scalpel in a low transverse fashion.  The uterine incision was entered digitally and the incision extended bluntly in a cranial-caudal fashion.  Retractors were removed and membranes were ruptured.  The infant was delivered atraumatically from breech presentation.  The umbilical cord was clamped and cut and the nose and mouth bulb suctioned.  The infant was handed off to waiting pediatric staff.       Cord blood gases were collected from a clamped segment of umbilical cord.  Cord blood was collected.  The placenta was removed using cord traction and uterine massage.  The uterus was exteriorized and cleared of all clots and debris.  The uterine incision was repaired with #1 Vicryl in a running locked fashion. A single-layer technique was used.  Additional hemostatic measures required: electrocautery and figure-of-eight sutures.    The incision was inspected and excellent hemostasis was noted.  The tubes and ovaries were noted to be normal.  The uterus was returned to the abdomen.  The gutters were cleared of all clots and debris.  Irrigation was used.  The uterine incision was again inspected and found to be hemostatic.       The peritoneum was reapproximated with 2-0 Vicryl.  The fascia was closed with #1 Vicryl in a running fashion.  The subcutaneous space was reapproximated using 2-0 Vicryl.      The skin  was closed using 3-0 Vicryl.  The patient was transferred to the recovery room in stable condition.    Paris Morales MD  10/10/2022

## 2022-10-10 NOTE — ANESTHESIA PREPROCEDURE EVALUATION
Anesthesia Evaluation     Patient summary reviewed and Nursing notes reviewed   NPO Solid Status: > 6 hours  NPO Liquid Status: > 6 hours           Airway   Mallampati: II  TM distance: >3 FB  Neck ROM: full  Dental      Pulmonary - negative pulmonary ROS   Cardiovascular - negative cardio ROS        Neuro/Psych- negative ROS  GI/Hepatic/Renal/Endo - negative ROS     Musculoskeletal (-) negative ROS    Abdominal    Substance History - negative use     OB/GYN    (+) Pregnant,         Other - negative ROS                       Anesthesia Plan    ASA 2     spinal and ITN       Anesthetic plan, risks, benefits, and alternatives have been provided, discussed and informed consent has been obtained with: patient.    Plan discussed with attending.        CODE STATUS:    Level Of Support Discussed With: Patient  Code Status (Patient has no pulse and is not breathing): CPR (Attempt to Resuscitate)  Medical Interventions (Patient has pulse or is breathing): Full Support  Release to patient: Routine Release

## 2022-10-10 NOTE — ANESTHESIA POSTPROCEDURE EVALUATION
Patient: Katy Mott Tubbesing    Procedure Summary     Date: 10/10/22 Room / Location: [facility] LABOR DELIVERY   [facility]IV[facility]    Anesthesia Start: 1605 Anesthesia Stop:     Procedure:  SECTION PRIMARY (Abdomen) Diagnosis:     Surgeons: Paris Morales MD Provider: Deny Garcia DO    Anesthesia Type: spinal, ITN ASA Status: 2          Anesthesia Type: spinal, ITN    Vitals  Vitals Value Taken Time   BP 95/55    Temp 98.1 F    Pulse 92 10/10/22 1720   Resp 16    SpO2 100 % 10/10/22 172   Vitals shown include unvalidated device data.        Post Anesthesia Care and Evaluation    Patient location during evaluation: bedside  Patient participation: complete - patient participated  Level of consciousness: awake  Pain score: 0  Pain management: satisfactory to patient    Airway patency: patent  Anesthetic complications: No anesthetic complications  PONV Status: none  Cardiovascular status: acceptable and hemodynamically stable  Respiratory status: acceptable  Hydration status: acceptable

## 2022-10-11 LAB
BASOPHILS # BLD AUTO: 0.05 10*3/MM3 (ref 0–0.2)
BASOPHILS NFR BLD AUTO: 0.4 % (ref 0–1.5)
DEPRECATED RDW RBC AUTO: 41.5 FL (ref 37–54)
EOSINOPHIL # BLD AUTO: 0.06 10*3/MM3 (ref 0–0.4)
EOSINOPHIL NFR BLD AUTO: 0.4 % (ref 0.3–6.2)
ERYTHROCYTE [DISTWIDTH] IN BLOOD BY AUTOMATED COUNT: 12 % (ref 12.3–15.4)
HCT VFR BLD AUTO: 30.3 % (ref 34–46.6)
HGB BLD-MCNC: 10.5 G/DL (ref 12–15.9)
IMM GRANULOCYTES # BLD AUTO: 0.08 10*3/MM3 (ref 0–0.05)
IMM GRANULOCYTES NFR BLD AUTO: 0.6 % (ref 0–0.5)
LYMPHOCYTES # BLD AUTO: 2.7 10*3/MM3 (ref 0.7–3.1)
LYMPHOCYTES NFR BLD AUTO: 19.8 % (ref 19.6–45.3)
MCH RBC QN AUTO: 33.3 PG (ref 26.6–33)
MCHC RBC AUTO-ENTMCNC: 34.7 G/DL (ref 31.5–35.7)
MCV RBC AUTO: 96.2 FL (ref 79–97)
MONOCYTES # BLD AUTO: 1.08 10*3/MM3 (ref 0.1–0.9)
MONOCYTES NFR BLD AUTO: 7.9 % (ref 5–12)
NEUTROPHILS NFR BLD AUTO: 70.9 % (ref 42.7–76)
NEUTROPHILS NFR BLD AUTO: 9.69 10*3/MM3 (ref 1.7–7)
NRBC BLD AUTO-RTO: 0 /100 WBC (ref 0–0.2)
PLATELET # BLD AUTO: 216 10*3/MM3 (ref 140–450)
PMV BLD AUTO: 11.1 FL (ref 6–12)
RBC # BLD AUTO: 3.15 10*6/MM3 (ref 3.77–5.28)
WBC NRBC COR # BLD: 13.66 10*3/MM3 (ref 3.4–10.8)

## 2022-10-11 PROCEDURE — 25010000002 KETOROLAC TROMETHAMINE PER 15 MG: Performed by: OBSTETRICS & GYNECOLOGY

## 2022-10-11 PROCEDURE — 85025 COMPLETE CBC W/AUTO DIFF WBC: CPT | Performed by: OBSTETRICS & GYNECOLOGY

## 2022-10-11 PROCEDURE — 0503F POSTPARTUM CARE VISIT: CPT

## 2022-10-11 RX ADMIN — SODIUM CHLORIDE, POTASSIUM CHLORIDE, SODIUM LACTATE AND CALCIUM CHLORIDE 125 ML/HR: 600; 310; 30; 20 INJECTION, SOLUTION INTRAVENOUS at 02:36

## 2022-10-11 RX ADMIN — KETOROLAC TROMETHAMINE 15 MG: 15 INJECTION, SOLUTION INTRAMUSCULAR; INTRAVENOUS at 18:00

## 2022-10-11 RX ADMIN — DOCUSATE SODIUM 100 MG: 100 CAPSULE, LIQUID FILLED ORAL at 21:26

## 2022-10-11 RX ADMIN — KETOROLAC TROMETHAMINE 15 MG: 15 INJECTION, SOLUTION INTRAMUSCULAR; INTRAVENOUS at 01:04

## 2022-10-11 RX ADMIN — SIMETHICONE 80 MG: 80 TABLET, CHEWABLE ORAL at 09:52

## 2022-10-11 RX ADMIN — DOCUSATE SODIUM 100 MG: 100 CAPSULE, LIQUID FILLED ORAL at 09:52

## 2022-10-11 RX ADMIN — ACETAMINOPHEN 1000 MG: 500 TABLET, FILM COATED ORAL at 15:29

## 2022-10-11 RX ADMIN — SIMETHICONE 80 MG: 80 TABLET, CHEWABLE ORAL at 18:00

## 2022-10-11 RX ADMIN — ACETAMINOPHEN 1000 MG: 500 TABLET, FILM COATED ORAL at 09:52

## 2022-10-11 RX ADMIN — KETOROLAC TROMETHAMINE 15 MG: 15 INJECTION, SOLUTION INTRAMUSCULAR; INTRAVENOUS at 06:32

## 2022-10-11 RX ADMIN — KETOROLAC TROMETHAMINE 15 MG: 15 INJECTION, SOLUTION INTRAMUSCULAR; INTRAVENOUS at 12:17

## 2022-10-11 RX ADMIN — SIMETHICONE 80 MG: 80 TABLET, CHEWABLE ORAL at 15:16

## 2022-10-11 RX ADMIN — PRENATAL VITAMINS-IRON FUMARATE 27 MG IRON-FOLIC ACID 0.8 MG TABLET 1 TABLET: at 09:52

## 2022-10-11 RX ADMIN — ACETAMINOPHEN 650 MG: 325 TABLET, FILM COATED ORAL at 21:26

## 2022-10-11 RX ADMIN — ACETAMINOPHEN 1000 MG: 500 TABLET, FILM COATED ORAL at 03:52

## 2022-10-11 NOTE — LACTATION NOTE
10/11/22 0935   Maternal Information   Date of Referral 10/11/22   Person Making Referral lactation consultant  (courtesy)   Maternal Reason for Referral breastfeeding currently;no prior breastfeeding experience  (First time breastfeeding mom.  Infant 37 week, male.  Has nursed well 3 times since delivery.  Breastfeeding education done, information given.  Mom has spectra pump-- instructional video encouraged.  Attempted at breast but infant too sleepy to latch.)   Infant Reason for Referral other (see comments);sleepy;tight frenulum  (Encouraged mom to pump for skipped feeds after first 24 hours.  Infant does appear to have an oral restrictio, type !! frenulum.  Infant biting when suck assessed but sleepy and hard to assess complete movement.  Encoruaged mom to call for latch check.)   Maternal Assessment   Breast Size Issue none   Breast Shape Bilateral:;round   Breast Density Bilateral:;soft   Nipples Bilateral:;everted   Left Nipple Symptoms intact   Right Nipple Symptoms intact   Maternal Infant Feeding   Maternal Emotional State relaxed;receptive   Infant Positioning cross-cradle   Signs of Milk Transfer none noted  (infant did not latch, too sleepy at the breast)   Pain with Feeding no   Comfort Measures Before/During Feeding infant position adjusted;latch adjusted;maternal position adjusted   Nipple Shape After Feeding, Left Breast round;symmetrical;appropriately projected   Latch Assistance minimal assistance;verbal guidance offered   Support Person Involvement actively supporting mother;verbally supports mother   Milk Expression/Equipment   Breast Pump Type double electric, personal   Equipment for Home Use breast pump ordered through insurance  (spectra in room)

## 2022-10-11 NOTE — PROGRESS NOTES
10/11/2022    Name:Katy Jackson    MR#:[id number]     PROGRESS NOTE:  Post-Op Day 1 S/P    HD:1    Subjective   37 y.o. yo Female  s/p CS at 37w6d doing well. Pain well controlled. Tolerating regular diet and having flatus. Lochia normal.     Patient Active Problem List   Diagnosis   • History of loop electrical excision procedure (LEEP)   • Pregnancy   • History of abnormal cervical Papanicolaou smear   • Clubfoot, congenital   • Fall   • Placental abruption with coagulation defect in third trimester   • SGA (small for gestational age), fetal, affecting care of mother, antepartum, third trimester, other fetus   • Uterine size date discrepancy pregnancy        Objective    Vitals  Temp:  Temp:  [98.1 °F (36.7 °C)-99.8 °F (37.7 °C)] 98.2 °F (36.8 °C)  Temp src: Axillary  BP:  BP: ()/(54-82) 92/55  Pulse:  Heart Rate:  [] 73  RR:   Resp:  [15-18] 16    General Awake, alert, no distress  Abdomen Soft, non-distended, fundus firm, below umbilicus, appropriately tender  Incision  Intact, no erythema or exudate  Extremities Calves NT bilaterally     I/O last 3 completed shifts:  In: 1200 [I.V.:1200]  Out: 4902 [Urine:4525; Blood:377]    LABS:   Lab Results   Component Value Date    WBC 13.66 (H) 10/11/2022    HGB 10.5 (L) 10/11/2022    HCT 30.3 (L) 10/11/2022    MCV 96.2 10/11/2022     10/11/2022       Infant: male . Doing well. Desires circ.       Assessment   1.  POD 1, PCS. Doing well.    2.  Anemic. Asymptomatic.       Plan: Doing well.  Routine postoperative care. Advance.       Active Problems:   None      Yoselyn Lyons, ANNELISE  10/11/2022 09:58 EDT

## 2022-10-11 NOTE — ANESTHESIA POSTPROCEDURE EVALUATION
Patient: Katy Mott Tubbesing    Procedure Summary     Date: 10/10/22 Room / Location: [facility] LABOR DELIVERY   [facility]OR DELIV[facility]    Anesthesia Start: 1605 Anesthesia Stop:     Procedure:  SECTION PRIMARY (Abdomen) Diagnosis:     Surgeons: Paris Morales MD Provider: Deny Garcia DO    Anesthesia Type: spinal, ITN ASA Status: 2          Anesthesia Type: spinal, ITN    Vitals  Vitals Value Taken Time   BP 92/55 10/11/22 0746   Temp 98.2 °F (36.8 °C) 10/11/22 0746   Pulse 73 10/11/22 0746   Resp 16 10/11/22 0746   SpO2 98 % 10/10/22 1817   Vitals shown include unvalidated device data.        Post Anesthesia Care and Evaluation    Patient location during evaluation: bedside  Patient participation: complete - patient participated  Level of consciousness: awake and alert  Pain management: adequate    Airway patency: patent  Anesthetic complications: No anesthetic complications    Cardiovascular status: acceptable  Respiratory status: acceptable  Hydration status: acceptable  Post Neuraxial Block status: Motor and sensory function returned to baseline and No signs or symptoms of PDPH

## 2022-10-12 PROCEDURE — 0503F POSTPARTUM CARE VISIT: CPT

## 2022-10-12 RX ADMIN — DOCUSATE SODIUM 100 MG: 100 CAPSULE, LIQUID FILLED ORAL at 22:08

## 2022-10-12 RX ADMIN — OXYCODONE 5 MG: 5 TABLET ORAL at 03:31

## 2022-10-12 RX ADMIN — ACETAMINOPHEN 650 MG: 325 TABLET, FILM COATED ORAL at 03:28

## 2022-10-12 RX ADMIN — IBUPROFEN 600 MG: 600 TABLET ORAL at 06:25

## 2022-10-12 RX ADMIN — DOCUSATE SODIUM 100 MG: 100 CAPSULE, LIQUID FILLED ORAL at 09:38

## 2022-10-12 RX ADMIN — OXYCODONE 5 MG: 5 TABLET ORAL at 22:08

## 2022-10-12 RX ADMIN — ACETAMINOPHEN 650 MG: 325 TABLET, FILM COATED ORAL at 09:38

## 2022-10-12 RX ADMIN — PRENATAL VITAMINS-IRON FUMARATE 27 MG IRON-FOLIC ACID 0.8 MG TABLET 1 TABLET: at 09:38

## 2022-10-12 RX ADMIN — SIMETHICONE 80 MG: 80 TABLET, CHEWABLE ORAL at 22:08

## 2022-10-12 RX ADMIN — IBUPROFEN 600 MG: 600 TABLET ORAL at 12:10

## 2022-10-12 RX ADMIN — IBUPROFEN 600 MG: 600 TABLET ORAL at 18:02

## 2022-10-12 RX ADMIN — OXYCODONE 5 MG: 5 TABLET ORAL at 15:38

## 2022-10-12 RX ADMIN — ACETAMINOPHEN 650 MG: 325 TABLET, FILM COATED ORAL at 15:38

## 2022-10-12 RX ADMIN — ACETAMINOPHEN 650 MG: 325 TABLET, FILM COATED ORAL at 22:08

## 2022-10-12 RX ADMIN — IBUPROFEN 600 MG: 600 TABLET ORAL at 00:52

## 2022-10-12 RX ADMIN — OXYCODONE 5 MG: 5 TABLET ORAL at 09:38

## 2022-10-12 NOTE — PLAN OF CARE
Goal Outcome Evaluation:  Plan of Care Reviewed With: patient           Outcome Evaluation: incision well approximated

## 2022-10-12 NOTE — PROGRESS NOTES
10/12/2022    Name:Katy Jackson    MR#:[id number]     PROGRESS NOTE:  Post-Op Day 2 S/P    HD:2    Subjective   37 y.o. yo Female  s/p CS at 37w6d doing well. Pain well controlled. Tolerating regular diet and having flatus. Lochia normal.     Patient Active Problem List   Diagnosis   • History of loop electrical excision procedure (LEEP)   • Pregnancy   • History of abnormal cervical Papanicolaou smear   • Clubfoot, congenital   • Fall   • Placental abruption with coagulation defect in third trimester   • SGA (small for gestational age), fetal, affecting care of mother, antepartum, third trimester, other fetus   • Uterine size date discrepancy pregnancy        Objective    Vitals  Temp:  Temp:  [97.6 °F (36.4 °C)-98.8 °F (37.1 °C)] 98.8 °F (37.1 °C)  Temp src: Axillary  BP:  BP: ()/(63-71) 102/67  Pulse:  Heart Rate:  [65-86] 65  RR:   Resp:  [16-18] 16    General Awake, alert, no distress  Abdomen Soft, non-distended, fundus firm, below umbilicus, appropriately tender  Incision  Intact, no erythema or exudate  Extremities Calves NT bilaterally     I/O last 3 completed shifts:  In: 3225 [P.O.:3000; I.V.:225]  Out: 8950 [Urine:8950]    LABS:   Lab Results   Component Value Date    WBC 13.66 (H) 10/11/2022    HGB 10.5 (L) 10/11/2022    HCT 30.3 (L) 10/11/2022    MCV 96.2 10/11/2022     10/11/2022       Infant: male . Doing well. Desires circ.                             Assessment   1.  POD 2, PCS. Doing well.    2.  Anemic. Asymptomatic.         Plan:    1.  Doing well.  Routine postoperative care. Advance.      Active Problems:   None      ANNELISE Gordillo  10/12/2022 10:30 EDT

## 2022-10-12 NOTE — LACTATION NOTE
10/12/22 0812   Maternal Information   Date of Referral 10/12/22   Person Making Referral lactation consultant   Maternal Reason for Referral breastfeeding currently  (Infant latched currently in football hold on right breast.  Infant latching and nursing well.  Weight down 7% from birth weight but nursing well.  Initiated mom pumping on spectra pump post every other feeding to assist with milk coming in.  Educated)   Infant Reason for Referral other (see comments)  (parents on paced bottle feeding if supplementation becomes necessary.  [place] feeding education done.)   Maternal Assessment   Breast Size Issue none   Breast Shape Bilateral:;round   Breast Density Bilateral:;soft   Nipples Bilateral:;everted   Left Nipple Symptoms intact;nontender   Right Nipple Symptoms intact;nontender   Maternal Infant Feeding   Maternal Emotional State receptive   Infant Positioning clutch/football  (right breast, great latch)   Signs of Milk Transfer audible swallow;deep jaw excursions noted   Pain with Feeding no   Nipple Shape After Feeding, Right round;symmetrical;appropriately projected   Latch Assistance none needed   Support Person Involvement actively supporting mother;verbally supports mother   Milk Expression/Equipment   Breast Pump Type double electric, personal  (spectra pump initiated)   Breast Pump Flange Type hard   Breast Pump Flange Size 24 mm   Breast Pumping   Breast Pumping Interventions post-feed pumping encouraged  (with every other feeding)   Breast Pumping double electric breast pump utilized

## 2022-10-13 VITALS
DIASTOLIC BLOOD PRESSURE: 63 MMHG | TEMPERATURE: 98.2 F | OXYGEN SATURATION: 98 % | SYSTOLIC BLOOD PRESSURE: 102 MMHG | HEART RATE: 71 BPM | RESPIRATION RATE: 16 BRPM

## 2022-10-13 PROCEDURE — 0503F POSTPARTUM CARE VISIT: CPT | Performed by: NURSE PRACTITIONER

## 2022-10-13 RX ORDER — IBUPROFEN 600 MG/1
600 TABLET, FILM COATED ORAL EVERY 6 HOURS
Qty: 60 TABLET | Refills: 0 | Status: SHIPPED | OUTPATIENT
Start: 2022-10-13 | End: 2022-11-21

## 2022-10-13 RX ORDER — OXYCODONE HYDROCHLORIDE 5 MG/1
5 TABLET ORAL EVERY 4 HOURS PRN
Qty: 18 TABLET | Refills: 0 | Status: SHIPPED | OUTPATIENT
Start: 2022-10-13 | End: 2022-10-17

## 2022-10-13 RX ADMIN — IBUPROFEN 600 MG: 600 TABLET ORAL at 01:08

## 2022-10-13 RX ADMIN — IBUPROFEN 600 MG: 600 TABLET ORAL at 07:03

## 2022-10-13 RX ADMIN — PRENATAL VITAMINS-IRON FUMARATE 27 MG IRON-FOLIC ACID 0.8 MG TABLET 1 TABLET: at 10:43

## 2022-10-13 RX ADMIN — OXYCODONE 5 MG: 5 TABLET ORAL at 04:21

## 2022-10-13 RX ADMIN — ACETAMINOPHEN 650 MG: 325 TABLET, FILM COATED ORAL at 10:43

## 2022-10-13 RX ADMIN — ACETAMINOPHEN 650 MG: 325 TABLET, FILM COATED ORAL at 04:21

## 2022-10-13 NOTE — LACTATION NOTE
Infant has lost 10.3% of birth weight.  Mom is nursing, pumping, and supplementing.  Encouraged mom to continue as currently planned and to follow up with lactation clinic if infant is not gaining off breast alone by a week post partum.

## 2022-10-13 NOTE — DISCHARGE SUMMARY
Discharge Summary    Date of Admission: 10/10/2022  Date of Discharge:  10/13/2022      Patient: Katy Jackson      MR#:[id number]    Primary Surgeon/OB: Paris Morales MD    Discharge Surgeon/OB:    Presenting Problem/History of Present Illness  IUGR (intrauterine growth restriction) affecting care of mother [O36.5990]     Patient Active Problem List   Diagnosis   • History of loop electrical excision procedure (LEEP)   • Pregnancy   • History of abnormal cervical Papanicolaou smear   • Clubfoot, congenital   • Fall   • Placental abruption with coagulation defect in third trimester   • SGA (small for gestational age), fetal, affecting care of mother, antepartum, third trimester, other fetus   • Uterine size date discrepancy pregnancy         Discharge Diagnosis:  section at 37w6d    Procedures:  , Low Transverse     10/10/2022    4:40 PM      Feeding method: Breastfeeding Status: Yes    Rh Immune globulin given: not applicable    Rubella vaccine given: not applicable    Discharge Date: 10/13/2022; Discharge Time: 10:08 EDT    Early Discharge:  NO    Hospital Course  Patient is a 37 y.o. female  at 37w6d status post  section with uneventful postoperative recovery.  Patient was advanced to regular diet on postoperative day#1.  On discharge, ambulating, tolerating a regular diet without any difficulties and her incision is dry, clean and intact.     Infant:   male  fetus 2739 g (6 lb 0.6 oz)  with Apgar scores of 8 , 9  at five minutes.    Circumcision: yes    Condition on Discharge:  Stable    Vital Signs  Temp:  [98.2 °F (36.8 °C)-98.8 °F (37.1 °C)] 98.2 °F (36.8 °C)  Heart Rate:  [70-87] 71  Resp:  [16-20] 16  BP: (102-111)/(57-70) 102/63    Lab Results   Component Value Date    WBC 13.66 (H) 10/11/2022    HGB 10.5 (L) 10/11/2022    HCT 30.3 (L) 10/11/2022    MCV 96.2 10/11/2022     10/11/2022       Discharge Disposition  Home or Self Care    Discharge  Medications     Discharge Medications      New Medications      Instructions Start Date   ibuprofen 600 MG tablet  Commonly known as: ADVIL,MOTRIN   600 mg, Oral, Every 6 Hours      oxyCODONE 5 MG immediate release tablet  Commonly known as: ROXICODONE   5 mg, Oral, Every 4 Hours PRN         Continue These Medications      Instructions Start Date   docusate sodium 100 MG capsule  Commonly known as: COLACE   Stool Softener   daily      prenatal (CLASSIC) vitamin  tablet  Generic drug: prenatal vitamin   Oral, Daily         Stop These Medications    loratadine 10 MG tablet  Commonly known as: CLARITIN     polyethylene glycol 17 GM/SCOOP powder  Commonly known as: MIRALAX     PreNatal  MG capsule  Generic drug: Docosahexaenoic Acid     Triamcinolone Acetonide 55 MCG/ACT nasal inhaler  Commonly known as: NASACORT        ASK your doctor about these medications      Instructions Start Date   cefdinir 300 MG capsule  Commonly known as: OMNICEF   No dose, route, or frequency recorded.             Discharge Diet:     Activity at Discharge:   Activity Instructions     Driving Restrictions      Type of Restriction: Driving    Driving Restrictions: No Driving (Time Limited)    Length: 2 Weeks    Lifting Restrictions      Type of Restriction: Lifting    Lifting Restrictions: Lifting Restriction (Indicate Limit)    Weight Limit (Pounds): 20    Length of Lifting Restriction: 2 weeks    Sexual Activity Restrictions      Type of Restriction: Sex    Explain Sexual Activity Restrictions: Pelvic rest for 6 weeks    Work Restrictions      Type of Restriction: Work    May Return to Work: Other    Return to Work Instructions: May return to work after 6 weeks          Follow-up Appointments  Future Appointments   Date Time Provider Department Center   10/17/2022  8:30 AM PAT [facility]     Additional Instructions for the Follow-ups that You Need to Schedule     Call MD With Problems / Concerns   As directed       Instructions: Call for temp > 100.3, severe pain, severe bleeding, headache that does not improve with medication, blurred vision, or postpartum depression.    Order Comments: Instructions: Call for temp > 100.3, severe pain, severe bleeding, headache that does not improve with medication, blurred vision, or postpartum depression.          Discharge Follow-up with Specified Provider: Dr. Nguyen; 2 Weeks   As directed      To: Dr. Nguyen    Follow Up: 2 Weeks               Nicole Lake, ANNELISE  10/13/22  10:08 EDT

## 2022-10-13 NOTE — PLAN OF CARE
Goal Outcome Evaluation:           Progress: improving  Outcome Evaluation: Inc. intact, lochia light, pain controlled with meds, VSS, voids

## 2022-10-17 ENCOUNTER — APPOINTMENT (OUTPATIENT)
Dept: PREADMISSION TESTING | Facility: HOSPITAL | Age: 37
End: 2022-10-17

## 2022-10-24 NOTE — PROGRESS NOTES
"Enter Query Response Below      Query Response:       No Placental abruption with coagulation defect was not present       If applicable, please update the problem list.     Patient: Katy Jackson        : 1985  Account: [id number]           Admit Date: 10/10/2022        How to Respond to this query:       a. Click New Note     b. Answer query within the yellow box.                c. Update the Problem List, if applicable.      If you have any questions about this query contact me at: [email or web address]     Dr. Morales,     38 yo female  admitted 10/10/22 with \"Intrauterine pregnancy at 37w6d weeks gestation with non-reassuring fetal status\" per History and Physical. Also noted plan for \"Her prenatal care is complicated by  advanced maternal age. Genetic screening was normal.  Her previous obstetric/gynecological history is noted  for is non-contributory. Primary  for breech.\"  Operative note same day includes, \"Placental abruption with coagulation defect in third trimester.  Estimated blood loss <500mL.  Additional hemostatic measures required: electrocautery and figure-of-eight sutures. The incision was inspected and excellent hemostasis was noted.\"  Discharge summary 10/13/22 \"uneventful postoperative recovery.\"      After study, was Placental abruption with coagulation defect clinically supported during this admission?    Yes Placental abruption with coagulation defect is supported with the following clinical indicators (please list)________________  No Placental abruption with coagulation defect is ruled out  Other (please specify)______________  Unable to determine    By submitting this query, we are merely seeking further clarification of documentation to accurately reflect all conditions that you are monitoring, evaluating, treating or that extend the hospitalization or utilize additional resources of care. Please utilize your independent clinical judgment when " addressing the question(s) above.     This query and your response, once completed, will be entered into the legal medical record.    Sincerely,  Lauren MERCHANT RN CCDS  System Director Clinical Documentation Integrity Program

## 2022-10-25 ENCOUNTER — POSTPARTUM VISIT (OUTPATIENT)
Dept: OBSTETRICS AND GYNECOLOGY | Facility: CLINIC | Age: 37
End: 2022-10-25

## 2022-10-25 VITALS
WEIGHT: 171.8 LBS | SYSTOLIC BLOOD PRESSURE: 116 MMHG | BODY MASS INDEX: 30.44 KG/M2 | HEIGHT: 63 IN | DIASTOLIC BLOOD PRESSURE: 72 MMHG

## 2022-10-25 DIAGNOSIS — Z48.89 ENCOUNTER FOR POST SURGICAL WOUND CHECK: ICD-10-CM

## 2022-10-25 PROCEDURE — 0503F POSTPARTUM CARE VISIT: CPT | Performed by: NURSE PRACTITIONER

## 2022-10-25 NOTE — PROGRESS NOTES
"      Chief Complaint   Patient presents with   • Postpartum Care       2 Week Postpartum Visit         Katy Mott Tubshahram is a 37 y.o.  who presents today for a 2 week postpartum check.        , Low Transverse    Information for the patient's :  [facility] [[id number]]   10/10/2022   male   [facility] Tub[facility]   2739 g (6 lb 0.6 oz)   Gestational Age: 37w6d      Baby Discharged with Mom  Delivering MD: Paris Morales MD.    Pregnancy complications: IUGR / SGA    Patient reports her incision is clean, dry, intact and redness. Patient describes vaginal bleeding as absent.  She is breast and bottle feeding. Patient is pumping. Patient denies bowel or bladder issues.    She does want to discuss options for contraception.     Patient denies postpartum depression. Postpartum Depression Screening Questionnaire: 5, no treatment indicated.      The additional following portions of the patient's history were reviewed and updated as appropriate: allergies and current medications.      Review of Systems   Constitutional: Negative.    Cardiovascular: Negative.    Gastrointestinal: Negative.    Genitourinary: Negative.    Psychiatric/Behavioral: Negative.        I have reviewed and agree with the HPI, ROS, and historical information as entered above. Nicole Lake, APRN    Objective   /72   Ht 160 cm (62.99\")   Wt 77.9 kg (171 lb 12.8 oz)   LMP 2022 (Exact Date)   Breastfeeding Yes Comment: pumping  BMI 30.44 kg/m²     Physical Exam  Vitals and nursing note reviewed.   Constitutional:       Appearance: Normal appearance. She is well-developed and normal weight.   HENT:      Head: Normocephalic and atraumatic.   Cardiovascular:      Rate and Rhythm: Normal rate and regular rhythm.   Pulmonary:      Effort: Pulmonary effort is normal.      Breath sounds: Normal breath sounds.   Abdominal:      General: A surgical scar is present.      Palpations: Abdomen " is soft. Abdomen is not rigid.      Comments: LTI CDI. Healing well. No s/s infection   Musculoskeletal:      Cervical back: Normal range of motion.   Neurological:      Mental Status: She is alert and oriented to person, place, and time.   Psychiatric:         Behavior: Behavior normal.              Assessment and Plan    Problem List Items Addressed This Visit    None  Visit Diagnoses     Postpartum follow-up    -  Primary    Encounter for post surgical wound check              1. S/p , 2 weeks postpartum.  Doing well.    2. Continued pelvic rest with a return to driving and light physical activity.  3. Baby doing well.  4. Breastfeeding going well.  5. Bottlefeeding going well.  6. No si/sx of postpartum depression  7. Contraception: contraceptive methods: Oral progesterone-only contraceptive. Will start at 6 weeks  8. FU in 4 weeks or sooner if problems    Nicole Lake, APRN  10/25/2022

## 2022-11-02 ENCOUNTER — TELEPHONE (OUTPATIENT)
Dept: OBSTETRICS AND GYNECOLOGY | Facility: CLINIC | Age: 37
End: 2022-11-02

## 2022-11-02 NOTE — TELEPHONE ENCOUNTER
Per TH- continue to monitor drainage and for s/s of infection. Informed patient of this and she v/u. Instructed patient to CB with s/s of infection or increase in pain.

## 2022-11-02 NOTE — TELEPHONE ENCOUNTER
Dr. Nguyen PP patient  C/S on 10/10/2022  2wk PP OV: 10/25/2022    S/w patient- patient states that below her C/S incision was draining clear and blood-tinged drainage this AM. Patient states that the drainage is coming from the L side of her incision. Patient reports a half of a inch spot of drainage on her underwear this AM. Patient states that she also notices a 1 inch raised area underneath her incision area that is draining. Patient states that this area is tender and red. Patient states that she ran her finger over the incision where the drainage spot was a noticed a clear liquid on her finger. Patient states that she doesn't think her incision is opened in that area, but has a hard time visualizing this area. Patient denies purulent drainage, warmth, swelling, fever, or chills. Informed patient that I will discuss this with a provider and CB with plan of care. She v/u.

## 2022-11-03 ENCOUNTER — TELEPHONE (OUTPATIENT)
Dept: OBSTETRICS AND GYNECOLOGY | Facility: CLINIC | Age: 37
End: 2022-11-03

## 2022-11-03 ENCOUNTER — POSTPARTUM VISIT (OUTPATIENT)
Dept: OBSTETRICS AND GYNECOLOGY | Facility: CLINIC | Age: 37
End: 2022-11-03

## 2022-11-03 VITALS
HEIGHT: 63 IN | WEIGHT: 167 LBS | SYSTOLIC BLOOD PRESSURE: 118 MMHG | BODY MASS INDEX: 29.59 KG/M2 | TEMPERATURE: 97.9 F | DIASTOLIC BLOOD PRESSURE: 68 MMHG

## 2022-11-03 DIAGNOSIS — Z48.89 ENCOUNTER FOR POST SURGICAL WOUND CHECK: ICD-10-CM

## 2022-11-03 PROCEDURE — 0503F POSTPARTUM CARE VISIT: CPT | Performed by: NURSE PRACTITIONER

## 2022-11-03 RX ORDER — CEPHALEXIN 500 MG/1
500 CAPSULE ORAL 4 TIMES DAILY
Qty: 40 CAPSULE | Refills: 0 | Status: SHIPPED | OUTPATIENT
Start: 2022-11-03 | End: 2022-11-13

## 2022-11-03 NOTE — PROGRESS NOTES
"      Chief Complaint   Patient presents with   • Postpartum Care     Increased incisional pain       Postpartum problems visit       Katy Mott Tubshahram is a 37 y.o.  who is s/p , Low Transverse    Information for the patient's :  [facility] [[id number]]   10/10/2022   male   [facility] Tubshahram   2739 g (6 lb 0.6 oz)   Gestational Age: 37w6d      Baby Discharged: Discharged with Mom  Delivering Physician: Paris Morales MD    She presents today for problems with her incision..    The patient complains of incision drainage and increased incisional pain. Rates her pain as 5-6/10. The drainage is yellow in color. She denies an odor. Patient has constant pain and redness incision line and right below it since yesterday.     Patient describes bleeding as light.  Patient is breast feeding.  denies bowel or bladder issues.    Patient denies postpartum depression. Postpartum Depression Screening Questionnaire: 5.    The additional following portions of the patient's history were reviewed and updated as appropriate: allergies and current medications.      Review of Systems   Constitutional: Negative.    HENT: Negative.    Eyes: Negative.    Respiratory: Negative.    Cardiovascular: Negative.    Gastrointestinal: Negative.    Endocrine: Negative.    Genitourinary: Negative.    Musculoskeletal: Negative.    Skin: Positive for wound.   Allergic/Immunologic: Negative.    Neurological: Negative.    Hematological: Negative.    Psychiatric/Behavioral: Negative.        I have reviewed and agree with the HPI, ROS, and historical information as entered above. Nicole Lake, APRN    Objective   /68   Temp 97.9 °F (36.6 °C)   Ht 160 cm (63\")   Wt 75.8 kg (167 lb)   LMP 2022 (Exact Date)   Breastfeeding Yes   BMI 29.58 kg/m²     Physical Exam  Vitals and nursing note reviewed.   Constitutional:       Appearance: Normal appearance. She is well-developed. "   HENT:      Head: Normocephalic and atraumatic.   Cardiovascular:      Rate and Rhythm: Normal rate and regular rhythm.   Pulmonary:      Effort: Pulmonary effort is normal.      Breath sounds: Normal breath sounds.   Abdominal:      General: A surgical scar is present.      Palpations: Abdomen is soft. Abdomen is not rigid.      Tenderness: There is abdominal tenderness.          Comments: Incision has an area of erythema with a scabbed area below incision on the left side. No active drainage.   Musculoskeletal:      Cervical back: Normal range of motion.   Neurological:      Mental Status: She is alert and oriented to person, place, and time.   Psychiatric:         Behavior: Behavior normal.              Assessment and Plan    Problem List Items Addressed This Visit    None  Visit Diagnoses     Postpartum follow-up    -  Primary    Encounter for post surgical wound check         wound infection        Relevant Medications    cephalexin (Keflex) 500 MG capsule          1. S/p , 3 weeks postpartum.   2. Incisional infection: Keflex Erx sent  3. Continued pelvic rest.   4. Contraception: contraceptive methods: Abstinence   5. Call for increase in swelling, redness, drainage or fever  6. Keep incision clean and dry, open to air as much as possible  7. Keep regularly scheduled FU appt on 22 wit FARZANEH Lake, APRN  2022

## 2022-11-03 NOTE — TELEPHONE ENCOUNTER
She is s/p primary C/S with Dr. Morales 10/10/2022. She was seen at her 2 week visit by Nicole BEARD and her inc was fine. She called yesterday because there was clear draining coming from her inc and today there is redness, it is painful to touch and puss coming out of what looks like an ingrown hair per pt. She has an apt at [facility] today for her baby and will come in this afternoon for eval.

## 2022-11-03 NOTE — TELEPHONE ENCOUNTER
Pt calling with increase pain of incision site and thinks it is worse than yesterday. Requests being seen today if possible.

## 2022-11-21 ENCOUNTER — POSTPARTUM VISIT (OUTPATIENT)
Dept: OBSTETRICS AND GYNECOLOGY | Facility: CLINIC | Age: 37
End: 2022-11-21

## 2022-11-21 VITALS
WEIGHT: 163.4 LBS | DIASTOLIC BLOOD PRESSURE: 74 MMHG | SYSTOLIC BLOOD PRESSURE: 108 MMHG | BODY MASS INDEX: 28.95 KG/M2 | HEIGHT: 63 IN

## 2022-11-21 PROCEDURE — 0503F POSTPARTUM CARE VISIT: CPT | Performed by: OBSTETRICS & GYNECOLOGY

## 2022-11-21 RX ORDER — POLYETHYLENE GLYCOL 3350 17 G/17G
17 POWDER, FOR SOLUTION ORAL DAILY
COMMUNITY

## 2022-11-21 NOTE — PROGRESS NOTES
Chief Complaint   Patient presents with   • Postpartum Care       6 Week Postpartum Visit         Katy Mott Tubshahram is a 37 y.o.  who presents today for a 6 week postpartum check. She was seen 11/3 for possible incision infection. Given Rx for keflex, finished prescription. She states area has improved, possibly has more scar tissue to the area.    C/S: breech     , Low Transverse    Information for the patient's :  [facility] [[id number]]   10/10/2022   male   [facility] Tubshahram   2739 g (6 lb 0.6 oz)   Gestational Age: 37w6d          Baby Discharged: Discharged with Mom  Delivering Physician: Paris Morales MD    At the time of delivery were you diagnosed with any of the following: None. The patient did not have a laceration or episiotomy  . Patient describes vaginal bleeding as absent.  Patient is breast feeding.  She desires contraceptive methods: Oral progesterone-only contraceptive for contraception.  Patient denies bowel or bladder issues.      Patient denies postpartum depression. Postpartum Depression Screening Questionnaire: 5, no treatment indicated.      Last Completed Pap Smear          Ordered - PAP SMEAR (Every 3 Years) Ordered on 2022  SCANNED - PAP SMEAR              Is the patient due for a pap today? No last was 22    The additional following portions of the patient's history were reviewed and updated as appropriate: allergies, current medications, past family history, past medical history, past social history and past surgical history.    Review of Systems   Constitutional: Negative.    HENT: Negative.    Eyes: Negative.    Respiratory: Negative.    Cardiovascular: Negative.    Gastrointestinal: Negative.    Endocrine: Negative.    Genitourinary: Negative.    Musculoskeletal: Negative.    Allergic/Immunologic: Negative.    Neurological: Negative.    Hematological: Negative.    Psychiatric/Behavioral: Negative.   "    All other systems reviewed and are negative.     I have reviewed and agree with the HPI, ROS, and historical information as entered above. Paris Morales MD    /74   Ht 160 cm (63\")   Wt 74.1 kg (163 lb 6.4 oz)   LMP 2022 (Exact Date)   Breastfeeding Yes   BMI 28.95 kg/m²     Physical Exam    PHYSICAL EXAM:    Abdomen: +BS, benign, no masses, soft, non-tender.  Incision: yes Well-healed, clean, dry, intact.  Extremities: No deep calf tenderness.        Assessment and Plan    Problem List Items Addressed This Visit     Breast feeding status of mother    6 weeks postpartum follow-up - Primary       1. S/p , 6 weeks postpartum.  Doing well.    2. Return to normal physical activity.  No pelvic restrictions.   3. Baby doing well.  4. Breastfeeding going well.  5. No si/sx of postpartum depression  6. Lactation information given  7. Discussed estrogen cream for vaginal dryness.  8. Contraception: contraceptive methods: Drospirenone only contraception  9. Return in about 1 year (around 2023) for Annual physical.     Paris Morales MD  2022  "

## 2022-12-13 ENCOUNTER — TELEPHONE (OUTPATIENT)
Dept: OBSTETRICS AND GYNECOLOGY | Facility: CLINIC | Age: 37
End: 2022-12-13

## 2022-12-13 RX ORDER — FLUCONAZOLE 150 MG/1
TABLET ORAL
Qty: 2 TABLET | Refills: 0 | Status: SHIPPED | OUTPATIENT
Start: 2022-12-13

## 2022-12-13 NOTE — TELEPHONE ENCOUNTER
PP-last OV 11/21/2022. Patient thinks she has yeast infection. C/o vaginal itching/burning and 'white' vaginal d/c.     She is sexually active again and using condoms which has not been typical and admits to exercise and unable to always shower after.     Plan to rx diflucan 150mg PO now, repeat 3 days. If no relief will need appointment for eval. She vu.

## 2023-03-09 ENCOUNTER — TELEPHONE (OUTPATIENT)
Dept: OBSTETRICS AND GYNECOLOGY | Facility: CLINIC | Age: 38
End: 2023-03-09
Payer: COMMERCIAL

## 2023-03-09 RX ORDER — DROSPIRENONE AND ETHINYL ESTRADIOL 0.02-3(28)
1 KIT ORAL DAILY
Qty: 28 TABLET | Refills: 8 | Status: SHIPPED | OUTPATIENT
Start: 2023-03-09

## 2023-03-09 NOTE — TELEPHONE ENCOUNTER
Patient stopped BF this week and would like to restart her previous birth control. Patient states that she was previously prescribed generic for Madai at [facility]. Informed patient that I will discuss this with a provider and CB with plan of care. She v/u.

## 2023-03-09 NOTE — TELEPHONE ENCOUNTER
Pt stated she has stopped breast feeding and would like to go back on the b/c she was on previously (stated she thought it was generic of gin?)  Pt asked if she needed an appointment or if it could just be called in for her

## 2023-06-09 ENCOUNTER — TELEPHONE (OUTPATIENT)
Dept: OBSTETRICS AND GYNECOLOGY | Facility: CLINIC | Age: 38
End: 2023-06-09
Payer: COMMERCIAL

## 2023-06-09 NOTE — TELEPHONE ENCOUNTER
PT STATES SHE IS HAVING SYMPTOMS OF A YEAST INFECTION WITH ITCHING, BURNING, AND COTTAGE-CHEESE DISCHARGE. PT STATES SHE IS CURRENTLY IN [place] FOR THE WEEKEND AND PT IS REQUESTING DIFLUCAN BE SENT TO [facility] ON [address] IN PT'S CHART. PLEASE ADVISE PT.

## 2023-06-09 NOTE — TELEPHONE ENCOUNTER
Spoke with pt and she states she has a yeast infection with symptoms of vaginal itching, burning, and thick white discharge. She states she just picked up some monistat. I recommended she try that since she is out of town and if symptoms persist to call Monday for an appt for evaluation. She VU

## 2023-06-09 NOTE — TELEPHONE ENCOUNTER
Caller: Katy Jackson    Relationship: Self    Best call back number: [phone]    What is the best time to reach you: ANYITME    Who are you requesting to speak with (clinical staff, provider,  specific staff member): CLINICAL    Do you know the name of the person who called: Dayanara Kaur MA     What was the call regarding: RX    UNABLE TO WT CALL

## 2023-10-24 RX ORDER — DROSPIRENONE AND ETHINYL ESTRADIOL 0.02-3(28)
1 KIT ORAL DAILY
Qty: 60 TABLET | Refills: 0 | Status: SHIPPED | OUTPATIENT
Start: 2023-10-24

## 2023-12-15 RX ORDER — DROSPIRENONE AND ETHINYL ESTRADIOL 0.02-3(28)
1 KIT ORAL DAILY
Qty: 28 TABLET | Refills: 0 | Status: SHIPPED | OUTPATIENT
Start: 2023-12-15

## 2024-01-03 ENCOUNTER — OFFICE VISIT (OUTPATIENT)
Dept: OBSTETRICS AND GYNECOLOGY | Facility: CLINIC | Age: 39
End: 2024-01-03
Payer: COMMERCIAL

## 2024-01-03 VITALS
DIASTOLIC BLOOD PRESSURE: 62 MMHG | HEIGHT: 63 IN | WEIGHT: 144 LBS | BODY MASS INDEX: 25.52 KG/M2 | SYSTOLIC BLOOD PRESSURE: 118 MMHG

## 2024-01-03 DIAGNOSIS — Z01.419 WOMEN'S ANNUAL ROUTINE GYNECOLOGICAL EXAMINATION: Primary | ICD-10-CM

## 2024-01-03 DIAGNOSIS — B37.31 VULVOVAGINAL CANDIDIASIS: ICD-10-CM

## 2024-01-03 DIAGNOSIS — N63.0 BREAST NODULE: ICD-10-CM

## 2024-01-03 DIAGNOSIS — N64.52 NIPPLE DISCHARGE: ICD-10-CM

## 2024-01-03 DIAGNOSIS — Z12.39 ENCOUNTER FOR BREAST CANCER SCREENING USING NON-MAMMOGRAM MODALITY: ICD-10-CM

## 2024-01-03 RX ORDER — FLUCONAZOLE 150 MG/1
150 TABLET ORAL ONCE
Qty: 2 TABLET | Refills: 2 | Status: SHIPPED | OUTPATIENT
Start: 2024-01-03 | End: 2024-01-03

## 2024-01-03 RX ORDER — MULTIPLE VITAMINS W/ MINERALS TAB 9MG-400MCG
1 TAB ORAL DAILY
COMMUNITY

## 2024-01-03 RX ORDER — DROSPIRENONE AND ETHINYL ESTRADIOL 0.02-3(28)
1 KIT ORAL DAILY
Qty: 84 TABLET | Refills: 3 | Status: SHIPPED | OUTPATIENT
Start: 2024-01-03

## 2024-01-03 RX ORDER — VALACYCLOVIR HYDROCHLORIDE 1 G/1
2000 TABLET, FILM COATED ORAL AS NEEDED
COMMUNITY
Start: 2023-09-10

## 2024-01-03 RX ORDER — DEXTROAMPHETAMINE SACCHARATE, AMPHETAMINE ASPARTATE, DEXTROAMPHETAMINE SULFATE AND AMPHETAMINE SULFATE 5; 5; 5; 5 MG/1; MG/1; MG/1; MG/1
TABLET ORAL
COMMUNITY
Start: 2023-10-12

## 2024-01-03 RX ORDER — AMOXICILLIN AND CLAVULANATE POTASSIUM 875; 125 MG/1; MG/1
1 TABLET, FILM COATED ORAL
COMMUNITY
Start: 2023-12-27

## 2024-01-03 RX ORDER — DEXTROAMPHETAMINE SACCHARATE, AMPHETAMINE ASPARTATE, DEXTROAMPHETAMINE SULFATE AND AMPHETAMINE SULFATE 2.5; 2.5; 2.5; 2.5 MG/1; MG/1; MG/1; MG/1
TABLET ORAL
COMMUNITY
Start: 2023-10-12

## 2024-01-03 NOTE — PROGRESS NOTES
"     Gynecologic Annual Exam Note        Gynecologic Exam        Subjective     HPI  Katy Mott Tubshahram is a 38 y.o.  female who presents for annual well woman exam as a established patient. There were no changes to her medical or surgical history since her last visit.. Patient reports problems with:  vaginal itching, vaginal irritation, nipple discharge, and hemorrhoids. Patient is currently taking Amoxicillin for an upper respiratory infection. Patient prescribed a prophylactic Diflucan with Amoxicillin. Patient states that she was only given 1 tablet and took this on 2023. Patient states that she stopped pumping in 2023. Patient states that her nipple discharge is located in her right breast. Patient states that she will only notice leaking when she is checking on her crying baby at night. Patient states that this has only occurred a \"handful\" of times. Patient describes nipple discharge as clear or white. Patient states that her stools are soft. Patient states that her hemorrhoids are still present from pregnancy/delivery. Patient states that she will notice some rectal bleeding with wiping when her hemorrhoids are inflamed.   Patient's last menstrual period was 2023 (approximate). Her periods occur every 25-35 days, lasting 3 days. The flow is moderate. She reports dysmenorrhea is none. Partner Status: Marital Status: . She is sexually active. She has not had new partners. STD testing recommendations have been explained to the patient and she does not desire STD testing.    Patient states that she believes that she is done having children.     Additional OB/GYN History   Current contraception: contraceptive methods: Condoms and OCP (estrogen/progesterone)  Desires to: continue contraception  Thromboembolic Disease: none  Age of menarche: 15    History of STD: no    Last Pap : 2022. Results: negative. HPV: negative.   Last Completed Pap Smear            PAP SMEAR (Every " 3 Years) Next due on 2022  SCANNED - PAP SMEAR                     History of abnormal Pap smear: yes - h/o LEEP in college- repeats normal  Gardasil status:completed  Family history of uterine, colon, breast, or ovarian cancer: yes - PGM- breast CA  Performs monthly Self-Breast Exam: yes  Exercises Regularly:yes  Feelings of Anxiety or Depression: no  Tobacco Usage?: No       Current Outpatient Medications:     amoxicillin-clavulanate (AUGMENTIN) 875-125 MG per tablet, Take 1 tablet by mouth., Disp: , Rfl:     amphetamine-dextroamphetamine (ADDERALL) 10 MG tablet, Take 1 tablet every day by oral route at noon for 30 days., Disp: , Rfl:     amphetamine-dextroamphetamine (ADDERALL) 20 MG tablet, Take 1 capsule every day by oral route in the morning., Disp: , Rfl:     drospirenone-ethinyl estradiol (RAFITA,GIANVI) 3-0.02 MG per tablet, TAKE 1 TABLET BY MOUTH DAILY, Disp: 28 tablet, Rfl: 0    multivitamin with minerals tablet tablet, Take 1 tablet by mouth Daily., Disp: , Rfl:     valACYclovir (VALTREX) 1000 MG tablet, Take 2 tablets by mouth As Needed., Disp: , Rfl:     fluconazole (DIFLUCAN) 150 MG tablet, Take 1 tablet by mouth 1 (One) Time for 1 dose. Use once and repeat in 3 days, Disp: 2 tablet, Rfl: 2     Patient is requesting refills of OCPs.    OB History          1    Para   1    Term   1       0    AB   0    Living   1         SAB   0    IAB   0    Ectopic   0    Molar   0    Multiple   0    Live Births   1                Health Maintenance   Topic Date Due    BMI FOLLOWUP  Never done    ANNUAL PHYSICAL  Never done    Annual Gynecologic Pelvic and Breast Exam  2023    INFLUENZA VACCINE  2023    COVID-19 Vaccine (2023- season) 2023    PAP SMEAR  2025    TDAP/TD VACCINES (2 - Td or Tdap) 2032    HEPATITIS C SCREENING  Completed    Pneumococcal Vaccine 0-64  Aged Out       Past Medical History:   Diagnosis Date    Abnormal Pap smear of cervix   "   Anemia     Cold sore     History of abnormal cervical Papanicolaou smear     cryo gyn surgery in [facility] [place]; normal since        Past Surgical History:   Procedure Laterality Date    ADENOIDECTOMY      CERVICAL BIOPSY  W/ LOOP ELECTRODE EXCISION       SECTION N/A 10/10/2022    Procedure:  SECTION PRIMARY;  Surgeon: Paris Morales MD;  Location: [facility] LABOR DELIVERY;  Service: Obstetrics/Gynecology;  Laterality: N/A;    LEEP      in [facility] [place]    WISDOM TOOTH EXTRACTION         The additional following portions of the patient's history were reviewed and updated as appropriate: allergies, current medications, past family history, past medical history, past social history, past surgical history, and problem list.    Review of Systems   Constitutional: Negative.    HENT: Negative.     Eyes: Negative.    Respiratory: Negative.     Cardiovascular: Negative.    Gastrointestinal: Negative.    Endocrine: Negative.    Genitourinary: Negative.  Positive for breast discharge.        Vaginal itching and Vaginal irritation   Musculoskeletal: Negative.    Skin: Negative.    Allergic/Immunologic: Negative.    Neurological: Negative.    Hematological: Negative.    Psychiatric/Behavioral: Negative.           I have reviewed and agree with the HPI, ROS, and historical information as entered above. Claire Nguyen MD          Objective   /62   Ht 160 cm (63\")   Wt 65.3 kg (144 lb)   LMP 2023 (Approximate)   Breastfeeding No   BMI 25.51 kg/m²     Physical Exam  Vitals and nursing note reviewed. Exam conducted with a chaperone present.   Constitutional:       Appearance: She is well-developed.   HENT:      Head: Normocephalic and atraumatic.   Neck:      Thyroid: No thyroid mass or thyromegaly.   Cardiovascular:      Rate and Rhythm: Normal rate and regular rhythm.      Heart sounds: No murmur heard.  Pulmonary:      Effort: Pulmonary effort is normal. No retractions.      Breath sounds: Normal " breath sounds. No wheezing, rhonchi or rales.   Chest:      Chest wall: No mass or tenderness.   Breasts:     Right: Normal. No mass, nipple discharge, skin change or tenderness.      Left: Normal. No mass, nipple discharge, skin change or tenderness.          Comments: Difficult CBE, very glandular with 1 cm nodular area in right at 1  Milky d/c expressible from right nipple  Abdominal:      General: Bowel sounds are normal.      Palpations: Abdomen is soft. Abdomen is not rigid. There is no mass.      Tenderness: There is no abdominal tenderness. There is no guarding.      Hernia: No hernia is present. There is no hernia in the left inguinal area.   Genitourinary:     Labia:         Right: No rash, tenderness or lesion.         Left: No rash, tenderness or lesion.       Vagina: Normal. No vaginal discharge or lesions.      Cervix: No cervical motion tenderness, discharge, lesion or cervical bleeding.      Uterus: Normal. Not enlarged, not fixed and not tender.       Adnexa:         Right: No mass or tenderness.          Left: No mass or tenderness.        Rectum: No external hemorrhoid.   Musculoskeletal:      Cervical back: Normal range of motion. No muscular tenderness.   Neurological:      Mental Status: She is alert and oriented to person, place, and time.   Psychiatric:         Behavior: Behavior normal.            Assessment and Plan    Problem List Items Addressed This Visit    None  Visit Diagnoses       Women's annual routine gynecological examination    -  Primary    Encounter for breast cancer screening using non-mammogram modality        Nipple discharge        Relevant Orders    NON-GYN CYTOLOGY, [facility] ([facility])    TSH    Prolactin    Breast nodule        Relevant Orders    Mammo Diagnostic Digital Tomosynthesis Bilateral With CAD    Vulvovaginal candidiasis        Relevant Medications    fluconazole (DIFLUCAN) 150 MG tablet            GYN annual well woman exam.   Reviewed pap guidelines.    Nipple d/c - smear and mamm ordered.  WIll check labs.  Discussed hemorrhoid and offered GI referral.   Rx sent in for suspected yeast infection.  Baby doing well.  Happy on current ADRIANNA.  Discussed benefits and risks of ADRIANNA including headache, nausea and breast tenderness in first several cycles.  Also discussed inherent risks of VTE and increased lifetime risk of breast cancer diagnosis with any hormonal birth control.  She can expect some BTB in the first 6-12 months of taking a combined oral contraceptive pill.  Encouraged to continue for 3-6 months before changing to a new pill.   Return in about 1 year (around 1/3/2025), or if symptoms worsen or fail to improve.    Claire Nguyen MD  01/03/2024

## 2024-01-04 LAB
PROLACTIN SERPL-MCNC: 27.5 NG/ML (ref 4.8–33.4)
REF LAB TEST METHOD: NORMAL
TSH SERPL DL<=0.005 MIU/L-ACNC: 1.07 UIU/ML (ref 0.27–4.2)

## 2024-02-09 ENCOUNTER — HOSPITAL ENCOUNTER (OUTPATIENT)
Dept: ULTRASOUND IMAGING | Facility: HOSPITAL | Age: 39
Discharge: HOME | End: 2024-02-09
Payer: COMMERCIAL

## 2024-02-09 ENCOUNTER — HOSPITAL ENCOUNTER (OUTPATIENT)
Dept: MAMMOGRAPHY | Facility: HOSPITAL | Age: 39
Discharge: HOME | End: 2024-02-09
Payer: COMMERCIAL

## 2024-02-09 DIAGNOSIS — N63.0 BREAST NODULE: ICD-10-CM

## 2024-02-09 PROCEDURE — 76642 ULTRASOUND BREAST LIMITED: CPT

## 2024-02-09 PROCEDURE — 77066 DX MAMMO INCL CAD BI: CPT

## 2024-02-09 PROCEDURE — G0279 TOMOSYNTHESIS, MAMMO: HCPCS

## 2025-01-05 RX ORDER — DROSPIRENONE AND ETHINYL ESTRADIOL 0.02-3(28)
1 KIT ORAL DAILY
Qty: 28 TABLET | Refills: 0 | Status: SHIPPED | OUTPATIENT
Start: 2025-01-05

## 2025-01-14 ENCOUNTER — OFFICE VISIT (OUTPATIENT)
Dept: OBSTETRICS AND GYNECOLOGY | Facility: CLINIC | Age: 40
End: 2025-01-14
Payer: COMMERCIAL

## 2025-01-14 VITALS — WEIGHT: 156.4 LBS | DIASTOLIC BLOOD PRESSURE: 80 MMHG | BODY MASS INDEX: 27.71 KG/M2 | SYSTOLIC BLOOD PRESSURE: 124 MMHG

## 2025-01-14 DIAGNOSIS — Z01.419 WELL WOMAN EXAM: Primary | ICD-10-CM

## 2025-01-14 DIAGNOSIS — Z12.39 ENCOUNTER FOR BREAST CANCER SCREENING USING NON-MAMMOGRAM MODALITY: ICD-10-CM

## 2025-01-14 DIAGNOSIS — Z01.419 WOMEN'S ANNUAL ROUTINE GYNECOLOGICAL EXAMINATION: ICD-10-CM

## 2025-01-14 DIAGNOSIS — Z30.41 ENCOUNTER FOR SURVEILLANCE OF CONTRACEPTIVE PILLS: ICD-10-CM

## 2025-01-14 RX ORDER — DROSPIRENONE AND ETHINYL ESTRADIOL 0.02-3(28)
1 KIT ORAL DAILY
Qty: 72 TABLET | Refills: 3 | Status: SHIPPED | OUTPATIENT
Start: 2025-01-14

## 2025-01-14 NOTE — PROGRESS NOTES
Gynecologic Annual Exam Note        Gynecologic Exam        Subjective     HPI  Katy Mott Tubshahram is a 39 y.o.  female who presents for annual well woman exam as a established patient. There were no changes to her medical or surgical history since her last visit.. Patient's last menstrual period was 2025. Her periods occur every 28, lasting 2-4 days.  The flow is light to medium. She denies dysmenorrhea. Marital Status: .  She is sexually active. She has not had new partners.. STD testing recommendations have been explained to the patient and she does not desire STD testing.    The patient would like to discuss the following complaints today: none    Additional OB/GYN History   contraceptive methods: OCP (estrogen/progesterone)  Desires to: continue contraception  Thromboembolic Disease: none  History of migraines: no  Age of menarche: 7th grade    History of STD: no    Last Pap : 2022. Results: negative. HPV: negative.   Last Completed Pap Smear            Ordered - PAP SMEAR (Every 3 Years) Ordered on 2022  SCANNED - PAP SMEAR                     History of abnormal Pap smear: yes - h/o LEEP  Gardasil status:completed  Family history of uterine, colon, breast, or ovarian cancer: yes - Breast  CA- PGM  Performs monthly Self-Breast Exam: yes  Exercises Regularly:yes  Feelings of Anxiety or Depression: no  Tobacco Usage?: No       Current Outpatient Medications:     amphetamine-dextroamphetamine (ADDERALL) 10 MG tablet, Take 1 tablet every day by oral route at noon for 30 days., Disp: , Rfl:     amphetamine-dextroamphetamine (ADDERALL) 20 MG tablet, Take 1 capsule every day by oral route in the morning., Disp: , Rfl:     drospirenone-ethinyl estradiol (Anya) 3-0.02 MG per tablet, Take 1 tablet by mouth Daily., Disp: 72 tablet, Rfl: 3    valACYclovir (VALTREX) 1000 MG tablet, Take 2 tablets by mouth As Needed., Disp: , Rfl:     multivitamin with minerals  tablet tablet, Take 1 tablet by mouth Daily. (Patient not taking: Reported on 2025), Disp: , Rfl:          OB History          1    Para   1    Term   1       0    AB   0    Living   1         SAB   0    IAB   0    Ectopic   0    Molar   0    Multiple   0    Live Births   1                Health Maintenance   Topic Date Due    ANNUAL PHYSICAL  Never done    COVID-19 Vaccine ( season) 2024    Annual Gynecologic Pelvic and Breast Exam  2025    PAP SMEAR  2025    TDAP/TD VACCINES (2 - Td or Tdap) 2032    HEPATITIS C SCREENING  Completed    INFLUENZA VACCINE  Completed    Pneumococcal Vaccine 0-64  Aged Out       Past Medical History:   Diagnosis Date    Abnormal Pap smear of cervix     Anemia     Cold sore     History of abnormal cervical Papanicolaou smear     cryo gyn surgery in [facility]; normal since        Past Surgical History:   Procedure Laterality Date    ADENOIDECTOMY      CERVICAL BIOPSY  W/ LOOP ELECTRODE EXCISION       SECTION N/A 10/10/2022    Procedure:  SECTION PRIMARY;  Surgeon: Paris Morales MD;  Location: [facility] LABOR DELIVERY;  Service: Obstetrics/Gynecology;  Laterality: N/A;    LEEP      in [facility]    WISDOM TOOTH EXTRACTION         The additional following portions of the patient's history were reviewed and updated as appropriate: allergies, current medications, past family history, past medical history, past social history, past surgical history, and problem list.    Review of Systems   Constitutional: Negative.    HENT: Negative.     Eyes: Negative.    Respiratory: Negative.     Cardiovascular: Negative.    Gastrointestinal: Negative.    Endocrine: Negative.    Genitourinary: Negative.    Musculoskeletal: Negative.    Skin: Negative.    Allergic/Immunologic: Negative.    Neurological: Negative.    Hematological: Negative.    Psychiatric/Behavioral: Negative.           I have reviewed and agree with the HPI, ROS, and  historical information as entered above. Claire Nguyen MD          Objective   /80   Wt 70.9 kg (156 lb 6.4 oz)   LMP 01/01/2025   BMI 27.71 kg/m²     Physical Exam  Vitals and nursing note reviewed. Exam conducted with a chaperone present.   Constitutional:       Appearance: She is well-developed.   HENT:      Head: Normocephalic and atraumatic.   Neck:      Thyroid: No thyroid mass or thyromegaly.   Cardiovascular:      Rate and Rhythm: Normal rate and regular rhythm.      Heart sounds: No murmur heard.  Pulmonary:      Effort: Pulmonary effort is normal. No retractions.      Breath sounds: Normal breath sounds. No wheezing, rhonchi or rales.   Chest:      Chest wall: No mass or tenderness.   Breasts:     Right: Normal. No mass, nipple discharge, skin change or tenderness.      Left: Normal. No mass, nipple discharge, skin change or tenderness.   Abdominal:      General: Bowel sounds are normal.      Palpations: Abdomen is soft. Abdomen is not rigid. There is no mass.      Tenderness: There is no abdominal tenderness. There is no guarding.      Hernia: No hernia is present. There is no hernia in the left inguinal area.   Genitourinary:     Labia:         Right: No rash, tenderness or lesion.         Left: No rash, tenderness or lesion.       Vagina: Normal. No vaginal discharge or lesions.      Cervix: No cervical motion tenderness, discharge, lesion or cervical bleeding.      Uterus: Normal. Not enlarged, not fixed and not tender.       Adnexa:         Right: No mass or tenderness.          Left: No mass or tenderness.        Rectum: No external hemorrhoid.   Musculoskeletal:      Cervical back: Normal range of motion. No muscular tenderness.   Neurological:      Mental Status: She is alert and oriented to person, place, and time.   Psychiatric:         Behavior: Behavior normal.            Assessment and Plan    Problem List Items Addressed This Visit    None  Visit Diagnoses       Well woman exam     -  Primary    Relevant Orders    LIQUID-BASED PAP SMEAR WITH HPV GENOTYPING REGARDLESS OF INTERPRETATION ([facility])    Women's annual routine gynecological examination        Encounter for breast cancer screening using non-mammogram modality        Encounter for surveillance of contraceptive pills                GYN annual well woman exam.   Reviewed pap guidelines.   Recommended use of Vitamin D replacement and getting adequate calcium in her diet. (1500mg)  Reviewed monthly self breast exams.  Instructed to call with lumps, pain, or breast discharge.    Reviewed HPV guidelines.  Reviewed exercise as a preventative health measures.    Happy on current ADRIANNA.  Return in about 1 year (around 1/14/2026).    Claire Nguyen MD  01/14/2025

## 2025-01-15 LAB — REF LAB TEST METHOD: NORMAL

## 2025-02-03 RX ORDER — DROSPIRENONE AND ETHINYL ESTRADIOL 0.02-3(28)
1 KIT ORAL DAILY
Qty: 84 TABLET | OUTPATIENT
Start: 2025-02-03

## (undated) DEVICE — SUT GUT CHRM 1 CTX 36IN 905H

## (undated) DEVICE — SUT PLAIN  3/0 CT1 27IN 842H

## (undated) DEVICE — TRAP FLD MINIVAC MEGADYNE 100ML

## (undated) DEVICE — SUT VIC 2/0 CT1 27IN J339H BX/36

## (undated) DEVICE — 4-PORT MANIFOLD: Brand: NEPTUNE 2

## (undated) DEVICE — GLV SURG BIOGEL LTX PF 6

## (undated) DEVICE — PK C/SECT 10

## (undated) DEVICE — SUT GUT CHRM 2/0 CT1 27IN 811H

## (undated) DEVICE — APPL CHLORAPREP TINTED 26ML TEAL

## (undated) DEVICE — TRY SPINE BLCK WHITACRE 25G 3X5IN

## (undated) DEVICE — PATIENT RETURN ELECTRODE, SINGLE-USE, CONTACT QUALITY MONITORING, ADULT, WITH 9FT CORD, FOR PATIENTS WEIGING OVER 33LBS. (15KG): Brand: MEGADYNE

## (undated) DEVICE — NDL HYPO ECLPS SFTY 18G 1 1/2IN

## (undated) DEVICE — PROXIMATE RH ROTATING HEAD SKIN STAPLERS (35 WIDE) CONTAINS 35 STAINLESS STEEL STAPLES: Brand: PROXIMATE

## (undated) DEVICE — MAT PREVALON MOBL TRANSFR AIR W/PAD REPROC 39X81IN

## (undated) DEVICE — HANDLE LARYNG F/O LED DISP: Brand: MEDLINE INDUSTRIES, INC.

## (undated) DEVICE — SOL IRR H2O BO 1000ML STRL

## (undated) DEVICE — SOL IRR NACL 0.9PCT BO 1000ML

## (undated) DEVICE — BOWL UTIL STRL 32OZ

## (undated) DEVICE — SYR ART BLD GAS HEP 1CC

## (undated) DEVICE — PENCL SMOKE/EVAC MEGADYNE TELESCP 10FT

## (undated) DEVICE — COATED VICRYL  (POLYGLACTIN 910) SUTURE, VIOLET BRAIDED, STERILE, SYNTHETIC ABSORBABLE SUTURE: Brand: COATED VICRYL

## (undated) DEVICE — CLTH CLENS READYCLEANSE PERI CARE PK/5

## (undated) DEVICE — TUBING WITH WAND